# Patient Record
Sex: MALE | Race: WHITE | NOT HISPANIC OR LATINO | Employment: FULL TIME | URBAN - METROPOLITAN AREA
[De-identification: names, ages, dates, MRNs, and addresses within clinical notes are randomized per-mention and may not be internally consistent; named-entity substitution may affect disease eponyms.]

---

## 2018-01-12 NOTE — MISCELLANEOUS
Message   Recorded as Task   Date: 11/03/2016 10:42 AM, Created By: Lisa Reynolds   Task Name: Follow Up   Assigned To: [facility] procedure,Team   Regarding Patient: Tiffanie Lott, Status: Active   CommentAnabella Anaya - 03 Nov 2016 10:42 AM     TASK CREATED  pt is S/P SACROCOCCYGEAL LIGMENT INJ 10/27/2016 by Dr Beau Hughes  no pain diary scanned   no f/u scheduled   Maddy Hale - 04 Nov 2016 9:05 AM     TASK EDITED  spoke to pt wife she stated his got relief from inj no pain, swelling or redness pt syd call office if he feels pain   Shalonda Giles - 04 Nov 2016 9:09 AM     TASK REPLIED TO: Previously Assigned To Shalonda Giles md aware   pt may f/u PRN then        Active Problems    1  Acute gastroenteritis (558 9) (K52 9)   2  Acute maxillary sinusitis (461 0) (J01 00)   3  Acute pharyngitis (462) (J02 9)   4  Acute sinusitis (461 9) (J01 90)   5  Acute URI (465 9) (J06 9)   6  Allergic rhinitis (477 9) (J30 9)   7  Anxiety disorder (300 00) (F41 9)   8  Coccydynia (724 79) (M53 3)   9  Fever (780 60) (R50 9)   10  Headache (784 0) (R51)   11  Insomnia (780 52) (G47 00)   12  Migraine headache (346 90) (G43 909)   13  Need for vaccination against respiratory syncytial virus (V04 82) (Z23)    Current Meds   1  Fluticasone Propionate 50 MCG/ACT Nasal Suspension; USE 2 SPRAYS IN EACH   NOSTRIL ONCE DAILY; Therapy: [id number] to (Last Rx:[id number])  Requested for: [id number] Ordered    Allergies    1  Iodinated Contrast Media    2   IV Dye    Signatures   Electronically signed by : Emigdio Shirley, ; Nov 4 2016  9:09AM EST                       (Author)

## 2018-01-15 NOTE — PROGRESS NOTES
Assessment    1  Acute sinusitis (461 9) (J01 90)   2  Acute pharyngitis (462) (J02 9)    Plan  Acute pharyngitis    · Rapid StrepA- POC; Source:Throat; Status:Complete;   Done: [id number] 11:48AM  Acute pharyngitis, Acute sinusitis    · Promethazine-DM 6 25-15 MG/5ML Oral Syrup; 1-2 tsp po qhs prn  Acute sinusitis    · Amoxicillin 875 MG Oral Tablet; Take 1 tablet twice daily    Discussion/Summary    #1 frontal sinusitis  #2 acute pharyngitis  - I reviewed with patient  Rapid strep was negative  At this point recommend treating sinuses with amoxicillin 875 twice a day  Continue conservative therapy  Recheck one week if not improved-earlier if worse  Patient to call for problems or concerns in the interim  The patient was counseled regarding diagnostic results, instructions for management, risk factor reductions, prognosis, patient and family education, impressions, risks and benefits of treatment options, importance of compliance with treatment  Possible side effects of new medications were reviewed with the patient/guardian today  The treatment plan was reviewed with the patient/guardian  The patient/guardian understands and agrees with the treatment plan      Chief Complaint    1  Cold Symptoms    History of Present Illness  HPI: as above  - 2 day hx of sore throat and sinus pain  Patient describes sore throat as being severe and lasting most of the day  He denies any fever but has chills  Pain is in the frontal sinuses and worse with head position  There is thick nasal discharge  Patient has occasional cough is productive in the morning  He denies shortness of breath      Review of Systems    Constitutional: as noted in HPI    ENT: as noted in HPI  Cardiovascular: no complaints of slow or fast heart rate, no chest pain, no palpitations, no leg claudication or lower extremity edema  Respiratory: as noted in HPI     Integumentary: no complaints of skin rash or lesion, no itching or dry skin, no skin wounds  Active Problems    1  Acute gastroenteritis (558 9) (K52 9)   2  Acute pharyngitis (462) (J02 9)   3  Acute sinusitis (461 9) (J01 90)   4  Anxiety disorder (300 00) (F41 9)   5  Fever (780 60) (R50 9)   6  Headache (784 0) (R51)   7  Insomnia (780 52) (G47 00)   8  Migraine headache (346 90) (G43 909)   9  Need for vaccination against respiratory syncytial virus (V04 82) (Z23)    Past Medical History    1  History of Hay fever (477 9) (J30 1)   2  History of migraine (V12 49) (Z86 69)   3  Need for vaccination against respiratory syncytial virus (V04 82) (Z23)  Active Problems And Past Medical History Reviewed: The active problems and past medical history were reviewed and updated today  Family History    1  Family history of Diabetes Mellitus (V18 0)   2  Family history of Heart Disease (V17 49)   3  Family history of Hypertension (V17 49)    4  Family history of Breast Cancer (V16 3)    5  Family history of Cancer    6  Family history of Cancer    7  Family history of Cancer    Social History    · Denied: History of Alcohol Use (History)   · Being A Social Drinker   · Daily Coffee Consumption (1  Cups/Day)   · Daily Cola Consumption (___ Cans/Day)   · Daily Tea Consumption (___ Cups/Day)   · Marital History - Currently    · Never A Smoker  The social history was reviewed and updated today  Surgical History    1  History of Hand Surgery   2  History of Nasal Septal Deviation Repair   3  History of Nose Surgery   4  History of Treatment Of Elbow Dislocation   5  History of Umbilical Hernia Repair - Over Age 4    Current Meds   1  No Reported Medications Recorded    The medication list was reviewed and updated today  Allergies    1  Iodinated Contrast Media    2   IV Dye    Vitals   Recorded: 11BBQ1188 11:15AM   Temperature 97 2 F   Heart Rate 72   Systolic 132   Diastolic 78   Height 5 ft 9 in   Weight 212 lb    BMI Calculated 31 31   BSA Calculated 2 11     Physical Exam    Constitutional   General appearance: Abnormal   Mildly ill-appearing male in no apparent distress  Eyes   Conjunctiva and lids: No swelling, erythema, or discharge  Pupils and irises: Equal, round and reactive to light  Ears, Nose, Mouth, and Throat   External inspection of ears and nose: Normal     Otoscopic examination: Tympanic membrance translucent with normal light reflex  Canals patent without erythema  Nasal mucosa, septum, and turbinates: Abnormal   Nose congested with thick discharge  Frontal sinuses are tender to palpation  Oropharynx: Abnormal   Posterior oropharynx with moderate erythema but no exit appreciated  Pulmonary   Respiratory effort: No increased work of breathing or signs of respiratory distress  Auscultation of lungs: Clear to auscultation, equal breath sounds bilaterally, no wheezes, no rales, no rhonci  Cardiovascular   Auscultation of heart: Normal rate and rhythm, normal S1 and S2, without murmurs  Lymphatic   Palpation of lymph nodes in neck: No lymphadenopathy  Skin   Skin and subcutaneous tissue: Normal without rashes or lesions           Results/Data  PHQ-2 Adult Depression Screening 29Jan2016 11:16AM User, Ahs     Test Name Result Flag Reference   PHQ-2 Adult Depression Score 0     Q1: 0, Q2: 0   PHQ-2 Adult Depression Screening Negative         Signatures   Electronically signed by : SHAILESH Ngo D ; Jan 29 2016  1:20PM EST                       (Author)

## 2018-05-08 ENCOUNTER — TELEPHONE (OUTPATIENT)
Dept: RADIOLOGY | Facility: CLINIC | Age: 48
End: 2018-05-08

## 2018-05-08 NOTE — TELEPHONE ENCOUNTER
Received voicemail from pt requesting to schedule an injection with Dr Nino Adamson  Called pt back at [phone] and had to [facility] for him to cb again

## 2018-05-16 ENCOUNTER — OFFICE VISIT (OUTPATIENT)
Dept: PAIN MEDICINE | Facility: CLINIC | Age: 48
End: 2018-05-16
Payer: COMMERCIAL

## 2018-05-16 ENCOUNTER — TRANSCRIBE ORDERS (OUTPATIENT)
Dept: LAB | Facility: CLINIC | Age: 48
End: 2018-05-16

## 2018-05-16 ENCOUNTER — APPOINTMENT (OUTPATIENT)
Dept: RADIOLOGY | Facility: CLINIC | Age: 48
End: 2018-05-16
Payer: COMMERCIAL

## 2018-05-16 VITALS
BODY MASS INDEX: 28.88 KG/M2 | HEIGHT: 69 IN | TEMPERATURE: 98.4 F | HEART RATE: 102 BPM | SYSTOLIC BLOOD PRESSURE: 101 MMHG | WEIGHT: 195 LBS | DIASTOLIC BLOOD PRESSURE: 72 MMHG

## 2018-05-16 DIAGNOSIS — M54.6 LEFT-SIDED THORACIC BACK PAIN, UNSPECIFIED CHRONICITY: ICD-10-CM

## 2018-05-16 DIAGNOSIS — M53.3 COCCYDYNIA: Primary | ICD-10-CM

## 2018-05-16 PROCEDURE — 72070 X-RAY EXAM THORAC SPINE 2VWS: CPT

## 2018-05-16 PROCEDURE — 99214 OFFICE O/P EST MOD 30 MIN: CPT | Performed by: NURSE PRACTITIONER

## 2018-05-16 RX ORDER — FLUTICASONE PROPIONATE 50 MCG
2 SPRAY, SUSPENSION (ML) NASAL DAILY
COMMUNITY
Start: 2016-04-14 | End: 2022-01-11 | Stop reason: ALTCHOICE

## 2018-05-16 NOTE — PROGRESS NOTES
Assessment:  1  Coccydynia    2  Left-sided thoracic back pain, unspecified chronicity        Plan:  The patient is a 51 y o  male with a history of coccydynia  The patient presents today with reemergence of coccydynia  Patient reports that his pain and symptoms are the same pain and symptoms he experienced prior to undergoing of sacrococcygeal ligament injection in October of 2017, which provided him 4 months relief  Therefore at this time the patient would like to proceed with a repeat sacrococcygeal ligament injection at this time  The patient was educated on the procedures most common risks  The patient verbalized understanding, and would like to proceed with the procedure  Therefore the patient be scheduled for an upcoming Tuesday or Thursday  The patient also presents today with left-sided midback pain which has been present for a couple of months patient reports that the pain starts in his mid back and radiates into his chest  Patient reported that he had gone to an urgent care center in the past where he was given a shot of Toradol, and was provided some oral NSAIDs which relieved his pain  Therefore, at this time I have referred the patient to physical therapy for evaluation and treatment of his left-sided thoracic back pain, and ordered the patient an x-ray of his thoracic spine for further evaluation  Patient was instructed that our office will call with results of his thoracic x-ray  I discussed with the patient that if his symptoms did not improve after 6 weeks of physical therapy, we could possibly move forward with a thoracic MRI his next office visit  Patient verbalized understanding  Complete risks and benefits including bleeding, infection, tissue reaction, nerve injury and allergic reaction were discussed  The approach was demonstrated using models and literature was provided  Verbal and written consent was obtained      The patient will follow up after his sacrococcygeal ligament injection, or sooner with the worsening of symptoms  History of Present Illness: The patient is a 51 y o  male with a history of coccydynia  The patient was last seen office on 10/27/2016 where he underwent a sacrococcygeal ligament injection, which provided him 4 months of relief  Patient presents for a follow up office visit in regards to chronic pain secondary to coccydynia and left mid back pain  The patient currently reports ongoing coccydynia and left mid back pain  Patient reports that his left-sided mid back pain started a couple months ago  Patient predominantly left-handed  Patient reports that he experiences an itching, tingling pain noted in his left thoracic area that radiates into his chest  Patient denies any recent injury or trauma  Patient denies bilateral leg weakness, or bowel or bladder issues  He describes his pain as dull aching, pressure-like pain that is intermittent nature occurring mostly during the evening hours  Patient reports that his pain is symptoms are unchanged since last visit  Patient currently rates his pain as 7 out 10 numeric pain scale  I have personally reviewed and/or updated the patient's past medical history, past surgical history, family history, social history, current medications, allergies, and vital signs today  Review of Systems:    Review of Systems   Respiratory: Negative for shortness of breath  Cardiovascular: Negative for chest pain  Gastrointestinal: Negative for constipation, diarrhea, nausea and vomiting  Musculoskeletal: Negative for arthralgias, gait problem, joint swelling and myalgias  Skin: Negative for rash  Neurological: Negative for dizziness, seizures and weakness  All other systems reviewed and are negative  History reviewed  No pertinent past medical history      Past Surgical History:   Procedure Laterality Date    ELBOW SURGERY Right     HAND SURGERY Right     NASAL SEPTUM SURGERY         Family History   Problem Relation Age of Onset    Cancer Mother     Cancer Father     Heart disease Father        Social History     Occupational History    Not on file  Social History Main Topics    Smoking status: Never Smoker    Smokeless tobacco: Never Used    Alcohol use No    Drug use: Unknown    Sexual activity: Not on file         Current Outpatient Prescriptions:     fluticasone (FLONASE) 50 mcg/act nasal spray, 2 sprays into each nostril daily, Disp: , Rfl:     Allergies   Allergen Reactions    Iodinated Diagnostic Agents Hives    Iodine Other (See Comments)     iv contrast->hives       Physical Exam:    /72   Pulse 102   Temp 98 4 °F (36 9 °C) (Oral)   Ht 5' 9" (1 753 m)   Wt 88 5 kg (195 lb)   BMI 28 80 kg/m²     Constitutional:normal, well developed, well nourished, alert, in no distress and non-toxic and no overt pain behavior    Eyes:anicteric  HEENT:grossly intact  Neck:supple, symmetric, trachea midline and no masses   Pulmonary:even and unlabored  Cardiovascular:No edema or pitting edema present  Skin:Normal without rashes or lesions and well hydrated  Psychiatric:Mood and affect appropriate  Neurologic:Cranial Nerves II-XII grossly intact  Musculoskeletal:normal    Thoracic Spine Exam    Appearance:  Normal lordosis  Palpation/Tenderness:  left thoracic paraspinal tenderness  Sensory:  no sensory deficits noted  Range of Motion:  Full range of motion with no pain or limitations in flexion, extension, lateral flexion and rotation  Motor Strength:  Left hip flexion:  5/5  Right hip flexion:  5/5  Left knee extension:  5/5  Right knee extension:  5/5  Left foot dorsiflexion:  5/5  Left foot plantar flexion:  5/5  Right foot dorsiflexion:  5/5  Right foot plantar flexion:  5/5        Imaging        Orders Placed This Encounter   Procedures    FL spine and pain procedure    X-ray thoracic spine 2 views    Ambulatory referral to Physical Therapy

## 2018-05-22 ENCOUNTER — TELEPHONE (OUTPATIENT)
Dept: PAIN MEDICINE | Facility: CLINIC | Age: 48
End: 2018-05-22

## 2018-05-29 ENCOUNTER — HOSPITAL ENCOUNTER (OUTPATIENT)
Dept: RADIOLOGY | Facility: CLINIC | Age: 48
Discharge: HOME | End: 2018-05-29
Attending: ANESTHESIOLOGY | Admitting: ANESTHESIOLOGY
Payer: COMMERCIAL

## 2018-05-29 VITALS
RESPIRATION RATE: 18 BRPM | SYSTOLIC BLOOD PRESSURE: 124 MMHG | DIASTOLIC BLOOD PRESSURE: 89 MMHG | TEMPERATURE: 98.2 F | HEART RATE: 76 BPM | OXYGEN SATURATION: 98 %

## 2018-05-29 DIAGNOSIS — M53.3 COCCYDYNIA: ICD-10-CM

## 2018-05-29 PROCEDURE — 20605 DRAIN/INJ JOINT/BURSA W/O US: CPT | Performed by: ANESTHESIOLOGY

## 2018-05-29 PROCEDURE — 77002 NEEDLE LOCALIZATION BY XRAY: CPT | Performed by: ANESTHESIOLOGY

## 2018-05-29 RX ORDER — METHYLPREDNISOLONE ACETATE 80 MG/ML
80 INJECTION, SUSPENSION INTRA-ARTICULAR; INTRALESIONAL; INTRAMUSCULAR; PARENTERAL; SOFT TISSUE ONCE
Status: COMPLETED | OUTPATIENT
Start: 2018-05-29 | End: 2018-05-29

## 2018-05-29 RX ORDER — BUPIVACAINE HCL/PF 2.5 MG/ML
10 VIAL (ML) INJECTION ONCE
Status: COMPLETED | OUTPATIENT
Start: 2018-05-29 | End: 2018-05-29

## 2018-05-29 RX ORDER — LIDOCAINE HYDROCHLORIDE 10 MG/ML
5 INJECTION, SOLUTION EPIDURAL; INFILTRATION; INTRACAUDAL; PERINEURAL ONCE
Status: COMPLETED | OUTPATIENT
Start: 2018-05-29 | End: 2018-05-29

## 2018-05-29 RX ADMIN — LIDOCAINE HYDROCHLORIDE 2 ML: 10 INJECTION, SOLUTION EPIDURAL; INFILTRATION; INTRACAUDAL; PERINEURAL at 09:08

## 2018-05-29 RX ADMIN — Medication 3 ML: at 09:10

## 2018-05-29 RX ADMIN — METHYLPREDNISOLONE ACETATE 80 MG: 80 INJECTION, SUSPENSION INTRA-ARTICULAR; INTRALESIONAL; INTRAMUSCULAR; PARENTERAL; SOFT TISSUE at 09:10

## 2018-05-29 NOTE — DISCHARGE INSTRUCTIONS
Steroid Joint Injection   WHAT YOU NEED TO KNOW:   A steroid joint injection is a procedure to inject steroid medicine into a joint  Steroid medicine decreases pain and inflammation  The injection may also contain an anesthetic (numbing medicine) to decrease pain  It may be done to treat conditions such as arthritis, gout, or carpal tunnel syndrome  The injections may be given in your knee, ankle, shoulder, elbow, wrist, ankle or sacroiliac joint  1  Do not apply heat to any area that is numb  If you have discomfort or soreness at the injection site, you may apply ice today, 20 minutes on and 20 minutes off  Tomorrow you may use ice or warm, moist heat  Do not apply ice or heat directly to the skin  2  You may have an increase or change in the discomfort for 36-48 hours after your treatment  Apply ice and continue with any pain medicine you have been prescribed  3  Do not do anything strenuous today  You may shower, but no tub baths or hot tubs today  You may resume your normal activities tomorrow, but do not overdo it  Resume normal activities slowly when you are feeling better  4  If you experience redness, drainage or swelling at the injection site, or if you develop a fever above 100 degrees, please call [facility] at [phone] or go to the Emergency Room  5  Continue to take all routine medicines prescribed by your primary care physician unless otherwise instructed by our staff  Most blood thinners should be started again according to your regularly scheduled dosing  If you have any questions, please give our office a call  If you have a problem specifically related to your procedure, please call our office at [phone]  Problems not related to your procedure should be directed to your primary care physician

## 2018-05-29 NOTE — H&P
History of Present Illness: The patient is a 51 y o  male who presents with complaints of tailbone pain secondary to coccydynia and is here today for a sacrococcygeal ligament injection  Patient Active Problem List   Diagnosis    Coccydynia    Migraine headache    Insomnia    Allergic rhinitis    Anxiety disorder       No past medical history on file  Past Surgical History:   Procedure Laterality Date    ELBOW SURGERY Right     HAND SURGERY Right     NASAL SEPTUM SURGERY           Current Outpatient Prescriptions:     fluticasone (FLONASE) 50 mcg/act nasal spray, 2 sprays into each nostril daily, Disp: , Rfl:     Allergies   Allergen Reactions    Iodinated Diagnostic Agents Hives    Iodine Other (See Comments)     iv contrast->hives       Physical Exam:   Vitals:    05/29/18 0851   BP: 130/86   Pulse: 83   Resp: 18   Temp: 98 2 °F (36 8 °C)   SpO2: 97%     General: Awake, Alert, Oriented x 3, Mood and affect appropriate  Respiratory: Respirations even and unlabored  Cardiovascular: Peripheral pulses intact; no edema  Musculoskeletal Exam:  Tailbone tenderness    ASA Score: 1    Assessment:   1   Coccydynia        Plan: sacrococcygeal ligament injection

## 2018-06-04 ENCOUNTER — TELEPHONE (OUTPATIENT)
Dept: RADIOLOGY | Facility: CLINIC | Age: 48
End: 2018-06-04

## 2019-02-19 ENCOUNTER — OFFICE VISIT (OUTPATIENT)
Dept: PAIN MEDICINE | Facility: CLINIC | Age: 49
End: 2019-02-19
Payer: COMMERCIAL

## 2019-02-19 VITALS — DIASTOLIC BLOOD PRESSURE: 78 MMHG | HEART RATE: 105 BPM | SYSTOLIC BLOOD PRESSURE: 124 MMHG | TEMPERATURE: 98.3 F

## 2019-02-19 DIAGNOSIS — M53.3 COCCYDYNIA: Primary | ICD-10-CM

## 2019-02-19 PROCEDURE — 99214 OFFICE O/P EST MOD 30 MIN: CPT | Performed by: ANESTHESIOLOGY

## 2019-02-19 NOTE — PROGRESS NOTES
Assessment:  1  Coccydynia        Plan: At this time, I discussed repeating the sacrococcygeal ligament injection that provided significant relief  He was again apprised of the most common risks and would like to proceed  He will be scheduled for an upcoming Tuesday or Thursday under fluoroscopic guidance  Complete risks and benefits including bleeding, infection, tissue reaction, nerve injury and allergic reaction were discussed  The approach was demonstrated using models and literature was provided  Verbal and written consent was obtained  My impressions and treatment recommendations were discussed in detail with the patient who verbalized understanding and had no further questions  Discharge instructions were provided  I personally saw and examined the patient and I agree with the above discussed plan of care  Orders Placed This Encounter   Procedures    FL spine and pain procedure     Standing Status:   Future     Standing Expiration Date:   2/19/2023     Scheduling Instructions:      Please double book for 11 am 2/26/19 (Patient flying to [facility] 3/4)     Order Specific Question:   Reason for Exam:     Answer:   Sacrococcygeal Ligament Injection     Order Specific Question:   Anticoagulant hold needed? Answer:   No     No orders of the defined types were placed in this encounter  History of Present Illness:  Hazeline Klinefelter  is a 51 y o  male who presents for a follow up office visit in regards to Back Pain  The patient has a history of coccydynia and was last seen in May 2018 for a sacrococcygeal ligament injection which provided significant relief up until recently  He has had recurrence of symptoms  He reports intermittent sharp pain in his tailbone region  I have personally reviewed and/or updated the patient's past medical history, past surgical history, family history, social history, current medications, allergies, and vital signs today       Review of Systems Respiratory: Negative for shortness of breath  Cardiovascular: Negative for chest pain  Gastrointestinal: Negative for constipation, diarrhea, nausea and vomiting  Musculoskeletal: Negative for arthralgias, gait problem, joint swelling and myalgias  Skin: Negative for rash  Neurological: Negative for dizziness, seizures and weakness  All other systems reviewed and are negative  Patient Active Problem List   Diagnosis    Coccydynia    Migraine headache    Insomnia    Allergic rhinitis    Anxiety disorder       No past medical history on file  Past Surgical History:   Procedure Laterality Date    ELBOW SURGERY Right     HAND SURGERY Right     NASAL SEPTUM SURGERY         Family History   Problem Relation Age of Onset   Anni Meléndez Cancer Mother     Cancer Father     Heart disease Father        Social History     Occupational History    Not on file   Tobacco Use    Smoking status: Never Smoker    Smokeless tobacco: Never Used   Substance and Sexual Activity    Alcohol use: No    Drug use: Not on file    Sexual activity: Not on file       Current Outpatient Medications on File Prior to Visit   Medication Sig    fluticasone (FLONASE) 50 mcg/act nasal spray 2 sprays into each nostril daily     No current facility-administered medications on file prior to visit  Allergies   Allergen Reactions    Iodinated Diagnostic Agents Hives    Iodine Other (See Comments)     iv contrast->hives       Physical Exam:    /78   Pulse 105   Temp 98 3 °F (36 8 °C) (Oral)     Constitutional:normal, well developed, well nourished, alert, in no distress and non-toxic and no overt pain behavior    Eyes:anicteric  HEENT:grossly intact  Neck:supple, symmetric, trachea midline and no masses   Pulmonary:even and unlabored  Cardiovascular:No edema or pitting edema present  Skin:Normal without rashes or lesions and well hydrated  Psychiatric:Mood and affect appropriate  Neurologic:Cranial Nerves II-XII grossly intact  Musculoskeletal:normal     Lumbar Spine Exam  Appearance:  Normal lordosis  Palpation/Tenderness:  Positive tailbone tenderness  Sensory:  no sensory deficits noted  Range of Motion:  Full range of motion with no pain or limitations in flexion, extension, lateral flexion and rotation  Motor Strength:  Left hip flexion:  5/5  Left hip extension:  5/5  Right hip flexion:  5/5  Right hip extension:  5/5  Left knee flexion:  5/5  Left knee extension:  5/5  Right knee flexion:  5/5  Right knee extension:  5/5  Left foot dorsiflexion:  5/5  Left foot plantar flexion:  5/5  Right foot dorsiflexion:  5/5  Right foot plantar flexion:  5/5  Reflexes:  Left Patellar:  2+   Right Patellar:  2+   Left Achilles:  2+   Right Achilles:  2+

## 2019-02-26 ENCOUNTER — HOSPITAL ENCOUNTER (OUTPATIENT)
Dept: RADIOLOGY | Facility: CLINIC | Age: 49
Discharge: HOME | End: 2019-02-26
Attending: ANESTHESIOLOGY
Payer: COMMERCIAL

## 2019-02-26 VITALS
TEMPERATURE: 98.8 F | HEART RATE: 83 BPM | SYSTOLIC BLOOD PRESSURE: 120 MMHG | OXYGEN SATURATION: 96 % | DIASTOLIC BLOOD PRESSURE: 80 MMHG | RESPIRATION RATE: 20 BRPM

## 2019-02-26 DIAGNOSIS — M53.3 COCCYDYNIA: ICD-10-CM

## 2019-02-26 PROCEDURE — 77002 NEEDLE LOCALIZATION BY XRAY: CPT | Performed by: ANESTHESIOLOGY

## 2019-02-26 PROCEDURE — 77002 NEEDLE LOCALIZATION BY XRAY: CPT

## 2019-02-26 PROCEDURE — 20605 DRAIN/INJ JOINT/BURSA W/O US: CPT | Performed by: ANESTHESIOLOGY

## 2019-02-26 RX ORDER — BUPIVACAINE HCL/PF 2.5 MG/ML
10 VIAL (ML) INJECTION ONCE
Status: COMPLETED | OUTPATIENT
Start: 2019-02-26 | End: 2019-02-26

## 2019-02-26 RX ORDER — METHYLPREDNISOLONE ACETATE 80 MG/ML
80 INJECTION, SUSPENSION INTRA-ARTICULAR; INTRALESIONAL; INTRAMUSCULAR; PARENTERAL; SOFT TISSUE ONCE
Status: COMPLETED | OUTPATIENT
Start: 2019-02-26 | End: 2019-02-26

## 2019-02-26 RX ORDER — 0.9 % SODIUM CHLORIDE 0.9 %
10 VIAL (ML) INJECTION ONCE
Status: COMPLETED | OUTPATIENT
Start: 2019-02-26 | End: 2019-02-26

## 2019-02-26 RX ADMIN — SODIUM CHLORIDE 2 ML: 9 INJECTION, SOLUTION INTRAMUSCULAR; INTRAVENOUS; SUBCUTANEOUS at 11:13

## 2019-02-26 RX ADMIN — BUPIVACAINE HYDROCHLORIDE 3 ML: 2.5 INJECTION, SOLUTION EPIDURAL; INFILTRATION; INTRACAUDAL at 11:15

## 2019-02-26 RX ADMIN — Medication 2 ML: at 11:13

## 2019-02-26 RX ADMIN — METHYLPREDNISOLONE ACETATE 80 MG: 80 INJECTION, SUSPENSION INTRA-ARTICULAR; INTRALESIONAL; INTRAMUSCULAR; PARENTERAL; SOFT TISSUE at 11:15

## 2019-02-26 NOTE — DISCHARGE INSTR - LAB
1  Do not apply heat to any area that is numb  If you have discomfort or soreness at the injection site, you may apply ice today, 20 minutes on and 20 minutes off  Tomorrow you may use ice or warm, moist heat  Do not apply ice or heat directly to the skin  2  If you experience severe shortness of breath, go to the Emergency Room  3  You may have numbness for several hours from the local anesthetic  Please use caution and common sense, especially with weight-bearing activities  4  You may have an increase or change in the discomfort for 36-48 hours after your treatment  Apply ice and continue with any pain medicine you have been prescribed  5  Do not do anything strenuous today  You may shower, but no tub baths or hot tubs today  You may resume your normal activities tomorrow, but do not overdo it  Resume normal activities slowly when you are feeling better  6  If you experience redness, drainage or swelling at the injection site, or if you develop a fever above 100 degrees, please call [facility] at [phone] or go to the Emergency Room  7  Continue to take all routine medicines prescribed by your primary care physician unless otherwise instructed by our staff  Most blood thinners should be started again according to your regularly scheduled dosing  If you have any questions, please give our office a call  If you have a problem specifically related to your procedure, please call our office at [phone]  Problems not related to your procedure should be directed to your primary care physician

## 2019-02-26 NOTE — H&P
History of Present Illness: The patient is a 51 y o  male who presents with complaints of tailbone main secondary to coccydynia and is here today for sacrococcygeal ligament injection  Patient Active Problem List   Diagnosis    Coccydynia    Migraine headache    Insomnia    Allergic rhinitis    Anxiety disorder       No past medical history on file  Past Surgical History:   Procedure Laterality Date    ELBOW SURGERY Right     HAND SURGERY Right     NASAL SEPTUM SURGERY           Current Outpatient Medications:     fluticasone (FLONASE) 50 mcg/act nasal spray, 2 sprays into each nostril daily, Disp: , Rfl:     Current Facility-Administered Medications:     bupivacaine (PF) (MARCAINE) 0 25 % injection 10 mL, 10 mL, Intra-articular, Once, Raymundo Franks MD    lidocaine (PF) (XYLOCAINE-MPF) 2 % injection 5 mL, 5 mL, Infiltration, Once, Raymundo Franks MD    methylPREDNISolone acetate (DEPO-MEDROL) injection 80 mg, 80 mg, Intra-articular, Once, Raymundo Franks MD    sodium chloride (PF) 0 9 % injection 10 mL, 10 mL, Infiltration, Once, Raymundo Franks MD    Allergies   Allergen Reactions    Iodinated Diagnostic Agents Hives    Iodine Other (See Comments)     iv contrast->hives       Physical Exam:   Vitals:    02/26/19 1058   BP: 111/76   Pulse: 87   Resp: 20   Temp: 98 8 °F (37 1 °C)   SpO2: 97%     General: Awake, Alert, Oriented x 3, Mood and affect appropriate  Respiratory: Respirations even and unlabored  Cardiovascular: Peripheral pulses intact; no edema  Musculoskeletal Exam:   Tailbone pain    ASA Score: 1    Patient/Chart Verification  Patient ID Verified: Verbal  Consents Confirmed: To be obtained in the Pre-Procedure area  H&P( within 30 days) Verified: To be obtained in the Pre-Procedure area  Allergies Reviewed: Yes  Anticoag/NSAID held?: NA  Currently on antibiotics?: No    Assessment:   1   Coccydynia        Plan: Sacrococcygeal Ligament Injection

## 2019-03-05 ENCOUNTER — TELEPHONE (OUTPATIENT)
Dept: PAIN MEDICINE | Facility: CLINIC | Age: 49
End: 2019-03-05

## 2021-04-14 NOTE — TELEPHONE ENCOUNTER
SINGH regarding results of thoracic x-ray is wnl  Left c/b #, [place] provided  Advised to call us with any questions        ----- Message from [address] [employer] sent at 5/22/2018 12:04 PM EDT -----  Please call the patient and let him know that his thoracic x-ray is wnl 
negative

## 2021-06-08 ENCOUNTER — TELEPHONE (OUTPATIENT)
Dept: FAMILY MEDICINE CLINIC | Facility: CLINIC | Age: 51
End: 2021-06-08

## 2021-06-08 NOTE — TELEPHONE ENCOUNTER
He wants to re est , hasn't been seen since 2016,  Having sleep issues, he thinks he needs a cpap  When can you see?

## 2021-06-14 NOTE — TELEPHONE ENCOUNTER
Called again and left another messsge to call the office if he still wants appoint with Dr Sheila Ceballos

## 2021-08-24 ENCOUNTER — OFFICE VISIT (OUTPATIENT)
Dept: FAMILY MEDICINE CLINIC | Facility: CLINIC | Age: 51
End: 2021-08-24
Payer: COMMERCIAL

## 2021-08-24 VITALS
SYSTOLIC BLOOD PRESSURE: 114 MMHG | HEART RATE: 112 BPM | HEIGHT: 69 IN | TEMPERATURE: 98.2 F | WEIGHT: 205 LBS | DIASTOLIC BLOOD PRESSURE: 70 MMHG | OXYGEN SATURATION: 96 % | BODY MASS INDEX: 30.36 KG/M2

## 2021-08-24 DIAGNOSIS — Z11.59 NEED FOR HEPATITIS C SCREENING TEST: ICD-10-CM

## 2021-08-24 DIAGNOSIS — G47.30 SLEEP APNEA, UNSPECIFIED TYPE: Primary | ICD-10-CM

## 2021-08-24 DIAGNOSIS — F33.1 MODERATE EPISODE OF RECURRENT MAJOR DEPRESSIVE DISORDER (HCC): ICD-10-CM

## 2021-08-24 DIAGNOSIS — Z00.00 ANNUAL PHYSICAL EXAM: ICD-10-CM

## 2021-08-24 DIAGNOSIS — Z12.12 SCREENING FOR COLORECTAL CANCER: ICD-10-CM

## 2021-08-24 DIAGNOSIS — Z12.11 SCREENING FOR COLORECTAL CANCER: ICD-10-CM

## 2021-08-24 PROCEDURE — 99386 PREV VISIT NEW AGE 40-64: CPT | Performed by: PHYSICIAN ASSISTANT

## 2021-08-24 PROCEDURE — 1036F TOBACCO NON-USER: CPT | Performed by: PHYSICIAN ASSISTANT

## 2021-08-24 PROCEDURE — 3008F BODY MASS INDEX DOCD: CPT | Performed by: PHYSICIAN ASSISTANT

## 2021-08-24 PROCEDURE — 3725F SCREEN DEPRESSION PERFORMED: CPT | Performed by: PHYSICIAN ASSISTANT

## 2021-08-24 RX ORDER — LEVOCETIRIZINE DIHYDROCHLORIDE 5 MG/1
5 TABLET, FILM COATED ORAL EVERY EVENING
COMMUNITY
End: 2022-01-11 | Stop reason: ALTCHOICE

## 2021-08-24 NOTE — PATIENT INSTRUCTIONS
Insomnia:  1) Get up at the same time seven days out of the week  2) Only go to bed when feeling sleepy  3) Wind down in the evening without electronics  4) Stimulus Control: If lying in bed for 15-20 minutes (estimated because the clock is turned away so you cannot see it) and you are not asleep get up and do something relaxing in a different room (reading a magazine article, solitaire with a deck of cards)  Do this in the middle of the night as well if awake  Avoid doing work or getting on the computer  5) Bedroom for sleep only  No watching TV or using electronics (computer, phone, tablet etc )  in bed  6) Turn clock away so you cannot see it in bed  7) Exercise regularly but try to avoid exercise within 4 hours of bedtime  Morning exercise is best      8) Avoid caffeine in the afternoon  Considering tapering down on caffeine by decreasing by one beverage with caffeine every 3 days until off  9) Avoid smoking near bedtime     10) Avoid alcohol before bed  If you consume one alcoholic beverage allow 3 hours between that drink and bedtime  If you consume two alcoholic beverages allow 5 hours  Between those drinks and bedtime  Alcohol may lead to waking at night  11) Avoid napping except for driving safety  If you feel to sleepy to drive do not drive  If you get sleepy while driving pull over and nap  You may resume driving once you feel alert  12) Read "No More Sleepless Nights" by Sugey Ricardo PhD      13) There are some on-line resources that do require a fee that can be of help  Two credible websites are as follows:  http://Xspand/cbt-online-insomnia-treatment html  [employer] si  An apolinar used by the [facility] [address] [facility] is as follows:  CBT-I   Go! To Sleep by the [facility]  Wellness Visit for Adults   AMBULATORY CARE:   A wellness visit  is when you see your healthcare provider to get screened for health problems   Your healthcare provider will also give you advice on how to stay healthy  Write down your questions so you remember to ask them  Ask your healthcare provider how often you should have a wellness visit  What happens at a wellness visit:  Your healthcare provider will ask about your health, and your family history of health problems  This includes high blood pressure, heart disease, and cancer  He or she will ask if you have symptoms that concern you, if you smoke, and about your mood  You may also be asked about your intake of medicines, supplements, food, and alcohol  Any of the following may be done:  · Your weight  will be checked  Your height may also be checked so your body mass index (BMI) can be calculated  Your BMI shows if you are at a healthy weight  · Your blood pressure  and heart rate will be checked  Your temperature may also be checked  · Blood and urine tests  may be done  Blood tests may be done to check your cholesterol levels  Abnormal cholesterol levels increase your risk for heart disease and stroke  You may also need a blood or urine test to check for diabetes if you are at increased risk  Urine tests may be done to look for signs of an infection or kidney disease  · A physical exam  includes checking your heartbeat and lungs with a stethoscope  Your healthcare provider may also check your skin to look for sun damage  · Screening tests  may be recommended  A screening test is done to check for diseases that may not cause symptoms  The screening tests you may need depend on your age, gender, family history, and lifestyle habits  For example, colorectal screening may be recommended if you are 48years old or older  Screening tests you need if you are a woman:   · A Pap smear  is used to screen for cervical cancer  Pap smears are usually done every 3 to 5 years depending on your age   You may need them more often if you have had abnormal Pap smear test results in the past  Ask your healthcare provider how often you should have a Pap smear  · A mammogram  is an x-ray of your breasts to screen for breast cancer  Experts recommend mammograms every 2 years starting at age 48 years  You may need a mammogram at age 52 years or younger if you have an increased risk for breast cancer  Talk to your healthcare provider about when you should start having mammograms and how often you need them  Vaccines you may need:   · Get an influenza vaccine  every year  The influenza vaccine protects you from the flu  Several types of viruses cause the flu  The viruses change over time, so new vaccines are made each year  · Get a tetanus-diphtheria (Td) booster vaccine  every 10 years  This vaccine protects you against tetanus and diphtheria  Tetanus is a severe infection that may cause painful muscle spasms and lockjaw  Diphtheria is a severe bacterial infection that causes a thick covering in the back of your mouth and throat  · Get a human papillomavirus (HPV) vaccine  if you are female and aged 23 to 32 or male 23 to 24 and never received it  This vaccine protects you from HPV infection  HPV is the most common infection spread by sexual contact  HPV may also cause vaginal, penile, and anal cancers  · Get a pneumococcal vaccine  if you are aged 72 years or older  The pneumococcal vaccine is an injection given to protect you from pneumococcal disease  Pneumococcal disease is an infection caused by pneumococcal bacteria  The infection may cause pneumonia, meningitis, or an ear infection  · Get a shingles vaccine  if you are 60 or older, even if you have had shingles before  The shingles vaccine is an injection to protect you from the varicella-zoster virus  This is the same virus that causes chickenpox  Shingles is a painful rash that develops in people who had chickenpox or have been exposed to the virus  How to eat healthy:  My Plate is a model for planning healthy meals   It shows the types and amounts of foods that should go on your plate  Fruits and vegetables make up about half of your plate, and grains and protein make up the other half  A serving of dairy is included on the side of your plate  The amount of calories and serving sizes you need depends on your age, gender, weight, and height  Examples of healthy foods are listed below:  · Eat a variety of vegetables  such as dark green, red, and orange vegetables  You can also include canned vegetables low in sodium (salt) and frozen vegetables without added butter or sauces  · Eat a variety of fresh fruits , canned fruit in 100% juice, frozen fruit, and dried fruit  · Include whole grains  At least half of the grains you eat should be whole grains  Examples include whole-wheat bread, wheat pasta, brown rice, and whole-grain cereals such as oatmeal     · Eat a variety of protein foods such as seafood (fish and shellfish), lean meat, and poultry without skin (turkey and chicken)  Examples of lean meats include pork leg, shoulder, or tenderloin, and beef round, sirloin, tenderloin, and extra lean ground beef  Other protein foods include eggs and egg substitutes, beans, peas, soy products, nuts, and seeds  · Choose low-fat dairy products such as skim or 1% milk or low-fat yogurt, cheese, and cottage cheese  · Limit unhealthy fats  such as butter, hard margarine, and shortening  Exercise:  Exercise at least 30 minutes per day on most days of the week  Some examples of exercise include walking, biking, dancing, and swimming  You can also fit in more physical activity by taking the stairs instead of the elevator or parking farther away from stores  Include muscle strengthening activities 2 days each week  Regular exercise provides many health benefits  It helps you manage your weight, and decreases your risk for type 2 diabetes, heart disease, stroke, and high blood pressure  Exercise can also help improve your mood   Ask your healthcare provider about the best exercise plan for you  General health and safety guidelines:   · Do not smoke  Nicotine and other chemicals in cigarettes and cigars can cause lung damage  Ask your healthcare provider for information if you currently smoke and need help to quit  E-cigarettes or smokeless tobacco still contain nicotine  Talk to your healthcare provider before you use these products  · Limit alcohol  A drink of alcohol is 12 ounces of beer, 5 ounces of wine, or 1½ ounces of liquor  · Lose weight, if needed  Being overweight increases your risk of certain health conditions  These include heart disease, high blood pressure, type 2 diabetes, and certain types of cancer  · Protect your skin  Do not sunbathe or use tanning beds  Use sunscreen with a SPF 15 or higher  Apply sunscreen at least 15 minutes before you go outside  Reapply sunscreen every 2 hours  Wear protective clothing, hats, and sunglasses when you are outside  · Drive safely  Always wear your seatbelt  Make sure everyone in your car wears a seatbelt  A seatbelt can save your life if you are in an accident  Do not use your cell phone when you are driving  This could distract you and cause an accident  Pull over if you need to make a call or send a text message  · Practice safe sex  Use latex condoms if are sexually active and have more than one partner  Your healthcare provider may recommend screening tests for sexually transmitted infections (STIs)  · Wear helmets, lifejackets, and protective gear  Always wear a helmet when you ride a bike or motorcycle, go skiing, or play sports that could cause a head injury  Wear protective equipment when you play sports  Wear a lifejacket when you are on a boat or doing water sports  © Copyright [address] 2021 Information is for End User's use only and may not be sold, redistributed or otherwise used for commercial purposes   All illustrations and images included in CareNotes® are the copyrighted property of A  [employer] M [employer]  or [facility]  The above information is an  only  It is not intended as medical advice for individual conditions or treatments  Talk to your doctor, nurse or pharmacist before following any medical regimen to see if it is safe and effective for you

## 2021-08-24 NOTE — ASSESSMENT & PLAN NOTE
PHQ-9 Depression Screening    PHQ-9:   Frequency of the following problems over the past two weeks:      Little interest or pleasure in doing things: 2 - more than half the days  Feeling down, depressed, or hopeless: 3 - nearly every day  Trouble falling or staying asleep, or sleeping too much: 1 - several days  Feeling tired or having little energy: 3 - nearly every day  Poor appetite or overeatin - not at all  Feeling bad about yourself - or that you are a failure or have let yourself or your family down: 1 - several days  Trouble concentrating on things, such as reading the newspaper or watching television: 2 - more than half the days  Moving or speaking so slowly that other people could have noticed  Or the opposite - being so fidgety or restless that you have been moving around a lot more than usual: 0 - not at all  Thoughts that you would be better off dead, or of hurting yourself in some way: 0 - not at all  PHQ-2 Score: 5  PHQ-9 Score: 12     Worsening above baseline due to stressor of potentially failing marriage   Started therapy yesterday  Directed to FU if wanting to purse medication options

## 2021-08-24 NOTE — PROGRESS NOTES
[facility]    NAME: Kala Weems  AGE: 45 y o  SEX: male  : 1970     DATE: 2021     Assessment and Plan:     Problem List Items Addressed This Visit        Respiratory    Sleep apnea - Primary     Symptomatic  - Ordered sleep study         Relevant Orders    Diagnostic Sleep Study       Other    Moderate episode of recurrent major depressive disorder (HCC)     PHQ-9 Depression Screening    PHQ-9:   Frequency of the following problems over the past two weeks:      Little interest or pleasure in doing things: 2 - more than half the days  Feeling down, depressed, or hopeless: 3 - nearly every day  Trouble falling or staying asleep, or sleeping too much: 1 - several days  Feeling tired or having little energy: 3 - nearly every day  Poor appetite or overeatin - not at all  Feeling bad about yourself - or that you are a failure or have let yourself or your family down: 1 - several days  Trouble concentrating on things, such as reading the newspaper or watching television: 2 - more than half the days  Moving or speaking so slowly that other people could have noticed  Or the opposite - being so fidgety or restless that you have been moving around a lot more than usual: 0 - not at all  Thoughts that you would be better off dead, or of hurting yourself in some way: 0 - not at all  PHQ-2 Score: 5  PHQ-9 Score: 12     Worsening above baseline due to stressor of potentially failing marriage   Started therapy yesterday  Directed to FU if wanting to purse medication options             Other Visit Diagnoses     Need for hepatitis C screening test        Relevant Orders    Hepatitis C Antibody ([facility])    Annual physical exam        Relevant Orders    TSH, 3rd generation with Free T4 reflex    CBC and Platelet    Comprehensive metabolic panel    Hemoglobin A1C    Lipid Panel with Direct LDL reflex    Screening for colorectal cancer        Relevant Orders    Ambulatory referral to Gastroenterology    BMI 30 0-30 9,adult          Pt is a 45 y o  male  Vaccines: UTD with COVID  encouraged Tdap  Sexual Health: denies any prostatic symptoms  - Labs: per orders    Discussed FU regarding anxiety/depression, possible occasional palpitations  Directed to go to ED with any persistent symptoms or CP    Immunizations and preventive care screenings were discussed with patient today  Appropriate education was printed on patient's after visit summary  Counseling:  Alcohol/drug use: discussed moderation in alcohol intake, the recommendations for healthy alcohol use, and avoidance of illicit drug use  Dental Health: discussed importance of regular tooth brushing, flossing, and dental visits  Sexual health: discussed sexually transmitted diseases, partner selection, use of condoms, avoidance of unintended pregnancy, and contraceptive alternatives  · Exercise: the importance of regular exercise/physical activity was discussed  Recommend exercise 3-5 times per week for at least 30 minutes  BMI Counseling: Body mass index is 30 27 kg/m²  The BMI is above normal  Nutrition recommendations include decreasing portion sizes, encouraging healthy choices of fruits and vegetables, limiting drinks that contain sugar and reducing intake of cholesterol  Exercise recommendations include moderate physical activity 150 minutes/week  No pharmacotherapy was ordered  Return in 3 months (on 11/24/2021)  Chief Complaint:     Chief Complaint   Patient presents with    [facility]      History of Present Illness:     Adult Annual Physical   Patient here for a comprehensive physical exam  The patient reports problems - sleep concerns  Sleeping problems, may have sleep apnea  Worsening the past 2 years  Wakes exhausted, and impacting ability to work  Wakes gasping for breath  Typically sleeps through the night   Able to fall asleep and stays asleep  Father has MAKAYLA  Sleep study several years ago which was inconclusive  Emotional stressor: Wife  from him last weekend  Work - Sedentary,  of professional services  Does not enjoy work, stressful, has not satisfying    Colonoscopy, long time ago  Feeling of depression for several years  Started therapy yesterday  Occasional palpitations at rest, improves with bearing down  1-2 times a month for 6 months  Diet and Physical Activity  · Diet/Nutrition: poor diet  · Exercise: no formal exercise  Depression Screening  PHQ-9 Depression Screening    PHQ-9:   Frequency of the following problems over the past two weeks:      Little interest or pleasure in doing things: 2 - more than half the days  Feeling down, depressed, or hopeless: 3 - nearly every day  Trouble falling or staying asleep, or sleeping too much: 1 - several days  Feeling tired or having little energy: 3 - nearly every day  Poor appetite or overeatin - not at all  Feeling bad about yourself - or that you are a failure or have let yourself or your family down: 1 - several days  Trouble concentrating on things, such as reading the newspaper or watching television: 2 - more than half the days  Moving or speaking so slowly that other people could have noticed  Or the opposite - being so fidgety or restless that you have been moving around a lot more than usual: 0 - not at all  Thoughts that you would be better off dead, or of hurting yourself in some way: 0 - not at all  PHQ-2 Score: 5  PHQ-9 Score: 12       General Health  · Sleep: see above  · Hearing: normal - bilateral   · Vision: no vision problems and wears glasses  · Dental: no dental visits for >1 year and brushes teeth twice daily  Delayed by [facility]  · Symptoms include: none     Review of Systems:     Review of Systems   Constitutional: Negative for activity change, chills, fatigue, fever and unexpected weight change     Respiratory: Positive for chest tightness  Negative for cough, shortness of breath and wheezing  Cardiovascular: Positive for palpitations (1-2 times a month, resolves with vasovagal )  Negative for chest pain and leg swelling  Gastrointestinal: Negative for constipation, diarrhea, nausea and vomiting  Musculoskeletal: Negative for back pain, neck pain and neck stiffness  Allergic/Immunologic: Negative for environmental allergies and food allergies  Neurological: Positive for headaches (1-2 times a week)  Negative for dizziness and weakness  Psychiatric/Behavioral: Positive for dysphoric mood and sleep disturbance  The patient is not nervous/anxious  Past Medical History:     No past medical history on file  Past Surgical History:     Past Surgical History:   Procedure Laterality Date    ELBOW SURGERY Right     HAND SURGERY Right     NASAL SEPTUM SURGERY        Family History:     Family History   Problem Relation Age of Onset    Aneurysm Mother     Heart disease Father     Heart attack Father     Diabetes Father     No Known Problems Son       Social History:     Social History     Socioeconomic History    Marital status: /Civil Union     Spouse name: Not on file    Number of children: Not on file    Years of education: Not on file    Highest education level: Not on file   Occupational History    Not on file   Tobacco Use    Smoking status: Never Smoker    Smokeless tobacco: Never Used   Substance and Sexual Activity    Alcohol use: No    Drug use: Not on file    Sexual activity: Not Currently   Other Topics Concern    Not on file   Social History Narrative    Not on file     Social Determinants of Health     Financial Resource Strain:     Difficulty of Paying Living Expenses:    Food Insecurity:     Worried About Running Out of Food in the Last Year:     Ran Out of Food in the Last Year:    Transportation Needs:     Lack of Transportation (Medical):      Lack of Transportation (Non-Medical):    Physical Activity:     Days of Exercise per Week:     Minutes of Exercise per Session:    Stress:     Feeling of Stress :    Social Connections:     Frequency of Communication with Friends and Family:     Frequency of Social Gatherings with Friends and Family:     Attends Yazidi Services:     Active Member of Clubs or Organizations:     Attends Club or Organization Meetings:     Marital Status:    Intimate Partner Violence:     Fear of Current or Ex-Partner:     Emotionally Abused:     Physically Abused:     Sexually Abused:       Current Medications:     Current Outpatient Medications   Medication Sig Dispense Refill    fluticasone (FLONASE) 50 mcg/act nasal spray 2 sprays into each nostril daily      levocetirizine (XYZAL) 5 MG tablet Take 5 mg by mouth every evening       No current facility-administered medications for this visit  Allergies: Allergies   Allergen Reactions    Iodinated Diagnostic Agents Hives      Physical Exam:     /70 (BP Location: Left arm, Patient Position: Sitting, Cuff Size: Standard)   Pulse (!) 112   Temp 98 2 °F (36 8 °C)   Ht 5' 9" (1 753 m)   Wt 93 kg (205 lb)   SpO2 96%   BMI 30 27 kg/m²     Physical Exam  Constitutional:       Appearance: He is well-developed  HENT:      Head: Normocephalic and atraumatic  Mouth/Throat:      Pharynx: No oropharyngeal exudate  Eyes:      Pupils: Pupils are equal, round, and reactive to light  Neck:      Thyroid: No thyromegaly  Cardiovascular:      Rate and Rhythm: Normal rate and regular rhythm  Heart sounds: Normal heart sounds  No murmur heard  Pulmonary:      Effort: Pulmonary effort is normal  No respiratory distress  Breath sounds: Normal breath sounds  No wheezing  Abdominal:      General: Bowel sounds are normal  There is no distension  Palpations: Abdomen is soft  Tenderness: There is no abdominal tenderness        Hernia: A hernia is present  Hernia is present in the umbilical area (large, reducable)  Musculoskeletal:         General: Normal range of motion  Cervical back: Normal range of motion and neck supple  Lymphadenopathy:      Cervical: No cervical adenopathy  Skin:     General: Skin is warm  Neurological:      Mental Status: He is alert and oriented to person, place, and time  Psychiatric:         Mood and Affect: Mood is depressed  Affect is tearful  Behavior: Behavior normal          Thought Content:  Thought content normal           Immanuel Gomez PA-C  [id number]

## 2021-08-26 ENCOUNTER — TELEPHONE (OUTPATIENT)
Dept: SLEEP CENTER | Facility: CLINIC | Age: 51
End: 2021-08-26

## 2021-08-26 NOTE — TELEPHONE ENCOUNTER
----- Message from Pardeep Lovelace MD sent at 8/26/2021  2:50 PM EDT -----  approved  ----- Message -----  From: Becca Lieberman  Sent: 1/50/0005  10:17 AM EDT  To: Sleep Medicine Basim Provider    This sleep study needs approval      If approved please sign and return to clerical pool  If denied please include reasons why  Also provide alternative testing if warranted  Please sign and return to clerical pool

## 2021-08-27 ENCOUNTER — APPOINTMENT (OUTPATIENT)
Dept: LAB | Facility: CLINIC | Age: 51
End: 2021-08-27
Payer: COMMERCIAL

## 2021-08-27 DIAGNOSIS — Z11.59 NEED FOR HEPATITIS C SCREENING TEST: ICD-10-CM

## 2021-08-27 DIAGNOSIS — Z00.00 ANNUAL PHYSICAL EXAM: ICD-10-CM

## 2021-08-27 LAB
ALBUMIN SERPL BCP-MCNC: 3.7 G/DL (ref 3.5–5)
ALP SERPL-CCNC: 59 U/L (ref 46–116)
ALT SERPL W P-5'-P-CCNC: 22 U/L (ref 12–78)
ANION GAP SERPL CALCULATED.3IONS-SCNC: 5 MMOL/L (ref 4–13)
AST SERPL W P-5'-P-CCNC: 12 U/L (ref 5–45)
BILIRUB SERPL-MCNC: 1.05 MG/DL (ref 0.2–1)
BUN SERPL-MCNC: 15 MG/DL (ref 5–25)
CALCIUM SERPL-MCNC: 9.3 MG/DL (ref 8.3–10.1)
CHLORIDE SERPL-SCNC: 105 MMOL/L (ref 100–108)
CHOLEST SERPL-MCNC: 205 MG/DL (ref 50–200)
CO2 SERPL-SCNC: 28 MMOL/L (ref 21–32)
CREAT SERPL-MCNC: 1.11 MG/DL (ref 0.6–1.3)
ERYTHROCYTE [DISTWIDTH] IN BLOOD BY AUTOMATED COUNT: 12.5 % (ref 11.6–15.1)
EST. AVERAGE GLUCOSE BLD GHB EST-MCNC: 108 MG/DL
GFR SERPL CREATININE-BSD FRML MDRD: 76 ML/MIN/1.73SQ M
GLUCOSE P FAST SERPL-MCNC: 93 MG/DL (ref 65–99)
HBA1C MFR BLD: 5.4 %
HCT VFR BLD AUTO: 48.9 % (ref 36.5–49.3)
HCV AB SER QL: NORMAL
HDLC SERPL-MCNC: 41 MG/DL
HGB BLD-MCNC: 16.5 G/DL (ref 12–17)
LDLC SERPL CALC-MCNC: 126 MG/DL (ref 0–100)
MCH RBC QN AUTO: 29.3 PG (ref 26.8–34.3)
MCHC RBC AUTO-ENTMCNC: 33.7 G/DL (ref 31.4–37.4)
MCV RBC AUTO: 87 FL (ref 82–98)
PLATELET # BLD AUTO: 234 THOUSANDS/UL (ref 149–390)
PMV BLD AUTO: 10.9 FL (ref 8.9–12.7)
POTASSIUM SERPL-SCNC: 4 MMOL/L (ref 3.5–5.3)
PROT SERPL-MCNC: 7.3 G/DL (ref 6.4–8.2)
RBC # BLD AUTO: 5.63 MILLION/UL (ref 3.88–5.62)
SODIUM SERPL-SCNC: 138 MMOL/L (ref 136–145)
TRIGL SERPL-MCNC: 191 MG/DL
TSH SERPL DL<=0.05 MIU/L-ACNC: 0.85 UIU/ML (ref 0.36–3.74)
WBC # BLD AUTO: 6.92 THOUSAND/UL (ref 4.31–10.16)

## 2021-08-27 PROCEDURE — 85027 COMPLETE CBC AUTOMATED: CPT

## 2021-08-27 PROCEDURE — 83036 HEMOGLOBIN GLYCOSYLATED A1C: CPT

## 2021-08-27 PROCEDURE — 80053 COMPREHEN METABOLIC PANEL: CPT

## 2021-08-27 PROCEDURE — 80061 LIPID PANEL: CPT

## 2021-08-27 PROCEDURE — 86803 HEPATITIS C AB TEST: CPT

## 2021-08-27 PROCEDURE — 84443 ASSAY THYROID STIM HORMONE: CPT

## 2021-08-27 PROCEDURE — 36415 COLL VENOUS BLD VENIPUNCTURE: CPT

## 2021-08-31 ENCOUNTER — TELEPHONE (OUTPATIENT)
Dept: GASTROENTEROLOGY | Facility: CLINIC | Age: 51
End: 2021-08-31

## 2021-08-31 NOTE — TELEPHONE ENCOUNTER
Received order from Timothy Nguyễn to call and schedule new pt colon screening  Called and left message for patient to call and schedule  Will call again in 1 wk if he doesn't return call to schedule

## 2021-09-01 ENCOUNTER — PREP FOR PROCEDURE (OUTPATIENT)
Dept: GASTROENTEROLOGY | Facility: CLINIC | Age: 51
End: 2021-09-01

## 2021-09-01 ENCOUNTER — HOSPITAL ENCOUNTER (OUTPATIENT)
Dept: SLEEP CENTER | Facility: CLINIC | Age: 51
Discharge: HOME | End: 2021-09-01
Payer: COMMERCIAL

## 2021-09-01 ENCOUNTER — TELEPHONE (OUTPATIENT)
Dept: GASTROENTEROLOGY | Facility: CLINIC | Age: 51
End: 2021-09-01

## 2021-09-01 DIAGNOSIS — Z12.11 SCREENING FOR COLON CANCER: Primary | ICD-10-CM

## 2021-09-01 DIAGNOSIS — G47.30 SLEEP APNEA, UNSPECIFIED TYPE: ICD-10-CM

## 2021-09-01 PROCEDURE — 95810 POLYSOM 6/> YRS 4/> PARAM: CPT | Performed by: PSYCHIATRY & NEUROLOGY

## 2021-09-01 PROCEDURE — 95810 POLYSOM 6/> YRS 4/> PARAM: CPT

## 2021-09-01 NOTE — TELEPHONE ENCOUNTER
09/01/21  Screened by: Prabhu Butts    Referring Provider Kirstin Anderson    Pre- Screening: There is no height or weight on file to calculate BMI  Has patient been referred for a routine screening Colonoscopy? yes  Is the patient between 39-70 years old? yes      Previous Colonoscopy yes   If yes:    Date: 10+ years     Facility: na     Reason: na      SCHEDULING STAFF: If the patient is between 45yrs-49yrs, please advise patient to confirm benefits/coverage with their insurance company for a routine screening colonoscopy, some insurance carriers will only cover at [address] or older  If the patient is over 66years old, please schedule an office visit  Does the patient want to see a Gastroenterologist prior to their procedure OR are they having any GI symptoms? no    Has the patient been hospitalized or had abdominal surgery in the past 6 months? no    Does the patient use supplemental oxygen? no    Does the patient take Coumadin, Lovenox, Plavix, Elliquis, Xarelto, or other blood thinning medication? no    Has the patient had a stroke, cardiac event, or stent placed in the past year? no    SCHEDULING STAFF: If patient answers NO to above questions, then schedule procedure  If patient answers YES to above questions, then schedule office appointment  If patient is between 45yrs - 49yrs, please advise patient that we will have to confirm benefits & coverage with their insurance company for a routine screening colonoscopy

## 2021-09-02 PROBLEM — G47.33 OSA (OBSTRUCTIVE SLEEP APNEA): Status: ACTIVE | Noted: 2021-08-24

## 2021-09-02 NOTE — PROGRESS NOTES
Sleep Study Documentation    Pre-Sleep Study       Sleep testing procedure explained to patient:YES    Patient napped prior to study:NO    Caffeine:Dayshift worker after 12PM   Caffeine use:YES- coffee  6 to 18 ounces and chocolate milk  8 ounces    Alcohol:Dayshift workers after 5PM: Alcohol use:NO    Typical day for patient:YES       Study Documentation    Sleep Study Indications: Snoring, Excessive Daytime Sleepiness, Parasomnia    Sleep Study: Diagnostic   Snore: Moderate  Supplemental O2: no    O2 flow rate (L/min) range   O2 flow rate (L/min) final   Minimum SaO2 81%  Baseline SaO2 97%    EKG abnormalities: no     EEG abnormalities: no    Sleep Study Recorded < 2 hours: N/A    Sleep Study Recorded > 2 hours but incomplete study: N/A    Sleep Study Recorded 6 hours but no sleep obtained: NO    Patient classification: employed       Post-Sleep Study    Medication used at bedtime or during sleep study:NO    Patient reports time it took to fall asleep:greater than 60 minutes    Patient reports waking up during study:3 or more times  Patient reports returning to sleep in greater than 30 minutes  Patient reports sleeping 2 to 4 hours without dreaming  Patient reports sleep during study:typical    Patient rated sleepiness: Very sleepy or tired    PAP treatment:no

## 2021-09-03 DIAGNOSIS — G47.30 SLEEP APNEA, UNSPECIFIED TYPE: ICD-10-CM

## 2021-09-03 DIAGNOSIS — G47.33 OSA (OBSTRUCTIVE SLEEP APNEA): Primary | ICD-10-CM

## 2021-10-08 ENCOUNTER — ANESTHESIA (OUTPATIENT)
Dept: GASTROENTEROLOGY | Facility: AMBULATORY SURGERY CENTER | Age: 51
End: 2021-10-08

## 2021-10-08 ENCOUNTER — HOSPITAL ENCOUNTER (OUTPATIENT)
Dept: GASTROENTEROLOGY | Facility: AMBULATORY SURGERY CENTER | Age: 51
Discharge: HOME | End: 2021-10-08
Payer: COMMERCIAL

## 2021-10-08 ENCOUNTER — ANESTHESIA EVENT (OUTPATIENT)
Dept: GASTROENTEROLOGY | Facility: AMBULATORY SURGERY CENTER | Age: 51
End: 2021-10-08

## 2021-10-08 VITALS
SYSTOLIC BLOOD PRESSURE: 103 MMHG | TEMPERATURE: 97.7 F | BODY MASS INDEX: 28.88 KG/M2 | RESPIRATION RATE: 18 BRPM | DIASTOLIC BLOOD PRESSURE: 77 MMHG | HEART RATE: 89 BPM | WEIGHT: 195 LBS | OXYGEN SATURATION: 97 % | HEIGHT: 69 IN

## 2021-10-08 DIAGNOSIS — Z12.11 SCREENING FOR COLON CANCER: ICD-10-CM

## 2021-10-08 PROCEDURE — G0121 COLON CA SCRN NOT HI RSK IND: HCPCS | Performed by: INTERNAL MEDICINE

## 2021-10-08 PROCEDURE — 00811 ANES LWR INTST NDSC NOS: CPT | Performed by: NURSE ANESTHETIST, CERTIFIED REGISTERED

## 2021-10-08 RX ORDER — SODIUM CHLORIDE 9 MG/ML
20 INJECTION, SOLUTION INTRAVENOUS CONTINUOUS
Status: DISCONTINUED | OUTPATIENT
Start: 2021-10-08 | End: 2021-10-12 | Stop reason: HOSPADM

## 2021-10-08 RX ORDER — PROPOFOL 10 MG/ML
INJECTION, EMULSION INTRAVENOUS AS NEEDED
Status: DISCONTINUED | OUTPATIENT
Start: 2021-10-08 | End: 2021-10-08

## 2021-10-08 RX ORDER — SODIUM CHLORIDE 9 MG/ML
30 INJECTION, SOLUTION INTRAVENOUS CONTINUOUS
Status: DISCONTINUED | OUTPATIENT
Start: 2021-10-08 | End: 2021-10-12 | Stop reason: HOSPADM

## 2021-10-08 RX ADMIN — PROPOFOL 100 MG: 10 INJECTION, EMULSION INTRAVENOUS at 09:53

## 2021-10-08 RX ADMIN — PROPOFOL 50 MG: 10 INJECTION, EMULSION INTRAVENOUS at 09:57

## 2021-10-08 RX ADMIN — SODIUM CHLORIDE: 9 INJECTION, SOLUTION INTRAVENOUS at 09:42

## 2021-10-08 RX ADMIN — PROPOFOL 100 MG: 10 INJECTION, EMULSION INTRAVENOUS at 09:49

## 2021-10-08 RX ADMIN — PROPOFOL 150 MG: 10 INJECTION, EMULSION INTRAVENOUS at 09:45

## 2021-11-19 ENCOUNTER — RA CDI HCC (OUTPATIENT)
Dept: OTHER | Facility: HOSPITAL | Age: 51
End: 2021-11-19

## 2021-11-29 ENCOUNTER — OFFICE VISIT (OUTPATIENT)
Dept: FAMILY MEDICINE CLINIC | Facility: CLINIC | Age: 51
End: 2021-11-29
Payer: COMMERCIAL

## 2021-11-29 VITALS
WEIGHT: 205 LBS | DIASTOLIC BLOOD PRESSURE: 80 MMHG | HEART RATE: 87 BPM | HEIGHT: 69 IN | BODY MASS INDEX: 30.36 KG/M2 | SYSTOLIC BLOOD PRESSURE: 120 MMHG | OXYGEN SATURATION: 96 % | TEMPERATURE: 98.6 F

## 2021-11-29 DIAGNOSIS — G47.33 OSA (OBSTRUCTIVE SLEEP APNEA): ICD-10-CM

## 2021-11-29 DIAGNOSIS — F41.1 GENERALIZED ANXIETY DISORDER: ICD-10-CM

## 2021-11-29 DIAGNOSIS — F33.1 MODERATE EPISODE OF RECURRENT MAJOR DEPRESSIVE DISORDER (HCC): Primary | ICD-10-CM

## 2021-11-29 PROCEDURE — 3008F BODY MASS INDEX DOCD: CPT | Performed by: PHYSICIAN ASSISTANT

## 2021-11-29 PROCEDURE — 99214 OFFICE O/P EST MOD 30 MIN: CPT | Performed by: PHYSICIAN ASSISTANT

## 2021-11-29 RX ORDER — ESCITALOPRAM OXALATE 10 MG/1
10 TABLET ORAL DAILY
Qty: 30 TABLET | Refills: 1 | Status: SHIPPED | OUTPATIENT
Start: 2021-11-29 | End: 2022-04-04 | Stop reason: ALTCHOICE

## 2021-11-29 RX ORDER — HYDROXYZINE 50 MG/1
50 TABLET, FILM COATED ORAL 3 TIMES DAILY PRN
Qty: 30 TABLET | Refills: 0 | Status: SHIPPED | OUTPATIENT
Start: 2021-11-29 | End: 2022-07-26

## 2022-01-04 ENCOUNTER — RA CDI HCC (OUTPATIENT)
Dept: OTHER | Facility: HOSPITAL | Age: 52
End: 2022-01-04

## 2022-01-04 NOTE — PROGRESS NOTES
[facility]  coding opportunities          Number of diagnosis code(s) already on the problem list added to I fla                     Patients insurance company: [employer])           With the beginning of the new year, this is a reminder to address all [facility]  (risk adjustment) codes for  as patient scores reset to zero MAGALY   F33 1 Moderate episode of recurrent major depressive disorder ([facility] )

## 2022-01-11 ENCOUNTER — OFFICE VISIT (OUTPATIENT)
Dept: FAMILY MEDICINE CLINIC | Facility: CLINIC | Age: 52
End: 2022-01-11
Payer: COMMERCIAL

## 2022-01-11 VITALS
TEMPERATURE: 96.9 F | RESPIRATION RATE: 18 BRPM | HEIGHT: 69 IN | HEART RATE: 82 BPM | BODY MASS INDEX: 30.51 KG/M2 | WEIGHT: 206 LBS | DIASTOLIC BLOOD PRESSURE: 96 MMHG | SYSTOLIC BLOOD PRESSURE: 152 MMHG | OXYGEN SATURATION: 96 %

## 2022-01-11 DIAGNOSIS — F41.1 GENERALIZED ANXIETY DISORDER: Primary | ICD-10-CM

## 2022-01-11 PROCEDURE — 1036F TOBACCO NON-USER: CPT | Performed by: PHYSICIAN ASSISTANT

## 2022-01-11 PROCEDURE — 99213 OFFICE O/P EST LOW 20 MIN: CPT | Performed by: PHYSICIAN ASSISTANT

## 2022-01-11 PROCEDURE — 3008F BODY MASS INDEX DOCD: CPT | Performed by: PHYSICIAN ASSISTANT

## 2022-01-11 RX ORDER — FLUOXETINE HYDROCHLORIDE 20 MG/1
20 CAPSULE ORAL DAILY
Qty: 90 CAPSULE | Refills: 1 | Status: SHIPPED | OUTPATIENT
Start: 2022-01-11 | End: 2022-04-04 | Stop reason: ALTCHOICE

## 2022-01-11 NOTE — ASSESSMENT & PLAN NOTE
Subjective improvement over the past 6 weeks  GAD7 score of 1, down from 10 after starting lexapro 10 mg    However, decreased energy since starting lexapro  Discussed changing to Prozac which may be more activating  - Ordered Prozac 20 mg   Pt will take Lexapro 5 mg for 7 days before d/c  FU PRN

## 2022-01-11 NOTE — PROGRESS NOTES
Assessment/Plan:    Generalized anxiety disorder  Subjective improvement over the past 6 weeks  GAD7 score of 1, down from 10 after starting lexapro 10 mg    However, decreased energy since starting lexapro  Discussed changing to Prozac which may be more activating  - Ordered Prozac 20 mg  Pt will take Lexapro 5 mg for 7 days before d/c  FU PRN             Subjective:    Patient ID: Ale Garcia  is a 45 y o  male  Pt is presenting today for 6 week FU of anxiety after starting Lexapro  Since starting his mood "has evened out" and he was sleeping beter  He does overall notice that since starting the Lexapro he has less energy and overall feeling tired  Used Atarax once which made him tired and euphoric  He does not want to or need to use it again  Depression  Associated symptoms include fatigue  Pertinent negatives include no chest pain, coughing, fever, nausea, neck pain, rash, sore throat or vomiting  The following portions of the patient's history were reviewed and updated as appropriate: allergies, current medications and problem list     Review of Systems   Constitutional: Positive for fatigue  Negative for activity change, fever and unexpected weight change  HENT: Negative for rhinorrhea and sore throat  Respiratory: Negative for cough, shortness of breath and wheezing  Cardiovascular: Negative for chest pain, palpitations and leg swelling  Gastrointestinal: Negative for constipation, diarrhea, nausea and vomiting  Musculoskeletal: Negative for back pain, neck pain and neck stiffness  Skin: Negative for rash  Psychiatric/Behavioral: Positive for depression  Objective:  /96 (BP Location: Left arm, Patient Position: Sitting, Cuff Size: Standard)   Pulse 82   Temp (!) 96 9 °F (36 1 °C)   Resp 18   Ht 5' 9" (1 753 m)   Wt 93 4 kg (206 lb)   SpO2 96%   BMI 30 42 kg/m²       Physical Exam  Vitals reviewed     Constitutional:       General: He is not in acute distress  Appearance: He is well-developed  He is obese  He is not diaphoretic  HENT:      Head: Normocephalic and atraumatic  Cardiovascular:      Rate and Rhythm: Normal rate and regular rhythm  Heart sounds: Normal heart sounds  No murmur heard  No friction rub  No gallop  Pulmonary:      Effort: Pulmonary effort is normal  No respiratory distress  Breath sounds: Normal breath sounds  No wheezing  Skin:     General: Skin is warm and dry  Neurological:      Mental Status: He is alert and oriented to person, place, and time  Psychiatric:         Behavior: Behavior normal          Thought Content:  Thought content normal

## 2022-03-28 ENCOUNTER — RA CDI HCC (OUTPATIENT)
Dept: OTHER | Facility: HOSPITAL | Age: 52
End: 2022-03-28

## 2022-03-28 NOTE — PROGRESS NOTES
[facility]  coding opportunities          Chart Reviewed number of suggestions sent to Provider: 1         With the beginning of the new year, this is a reminder to address all [facility]  (risk adjustment) codes for 2022 as patient scores reset to zero MAGALY   F33 1 Moderate episode of recurrent major depressive disorder ([facility] )       Patients Insurance        Commercial Insurance: [address] [place] [address]

## 2022-04-03 NOTE — PROGRESS NOTES
FAMILY MEDICINE PROGRESS NOTE    Date of Service: 22  Primary Care Provider:   Charity Louise MD       Name: Alicja Omalley  : 1970       Age:52 y o  Sex: male      MRN: [id number]      Chief Complaint:Medication Management (discuss medications)       ASSESSMENT and PLAN:  Alicja Omalley  is a 47 y o  male with:     Problem List Items Addressed This Visit        Other    Generalized anxiety disorder - Primary     Patient with good treatment response to Lexapro and Prozac however had undesirable side effects including fatigue and sexual side effects  Discussed resuming either Lexapro and Prozac and adding Wellbutrin, or switching to other medication  Patient opted to try alternative mediation, will start Effexor at 37 5 mg daily  Can increase to 75 mg daily if tolerated  Patient to call or send [email or web address] message in 4 weeks for update  Continue with therapy  Recommended regular exercise, healthy eating  Relevant Medications    venlafaxine (EFFEXOR-XR) 37 5 mg 24 hr capsule    Moderate episode of recurrent major depressive disorder (HCC)    Relevant Medications    venlafaxine (EFFEXOR-XR) 37 5 mg 24 hr capsule    Drug-induced erectile dysfunction     Drug induced from SSRI versus psychological versus vasogenic or some combination  Will trial Viagra, recommended starting with cutting 50 mg in half,         Relevant Medications    sildenafil (VIAGRA) 50 MG tablet          SUBJECTIVE:  Alicja Omalley  is a 47 y o  male who presents today with a chief complaint of Medication Management (discuss medications)  HPI     Patient presents today for follow-up  He was started on Prozac for anxiety in January  This was switched from Lexapro due to symptoms of fatigue  Patient reports that the Lexapro worked really well for his mood, however it caused a lot of fatigue  He did try to take the medication in the morning and at night, however even when taking Lexapro at night he had fatigue  He also had sexual side effects with Lexapro as well  He self discontinued Prozac in the last week due to sexual dysfunction  His primary mood disturbance is anxiety versus depression  He reports his sleep has been good  He was recently started on CPAP machine, which has improved his sleep significantly  Review of Systems   Constitutional: Positive for fatigue (from medications)  Genitourinary:        ED   Psychiatric/Behavioral: Positive for dysphoric mood  Negative for sleep disturbance  The patient is nervous/anxious  I have reviewed the patient's Past Medical History  Current Outpatient Medications:     hydrOXYzine HCL (ATARAX) 50 mg tablet, Take 1 tablet (50 mg total) by mouth 3 (three) times a day as needed for itching (Patient not taking: Reported on 4/4/2022 ), Disp: 30 tablet, Rfl: 0    sildenafil (VIAGRA) 50 MG tablet, Take 1 tablet (50 mg total) by mouth daily as needed for erectile dysfunction, Disp: 10 tablet, Rfl: 0    venlafaxine (EFFEXOR-XR) 37 5 mg 24 hr capsule, Take 1 capsule daily, if tolerated can increase to 2 capsules daily after two weeks, Disp: 30 capsule, Rfl: 3    OBJECTIVE:  /82 (BP Location: Left arm, Patient Position: Sitting, Cuff Size: Standard)   Pulse 87   Temp 97 9 °F (36 6 °C)   Resp 18   Ht 5' 9" (1 753 m)   Wt 93 4 kg (206 lb)   SpO2 97%   BMI 30 42 kg/m²    BP Readings from Last 3 Encounters:   04/04/22 122/82   01/11/22 152/96   11/29/21 120/80      Wt Readings from Last 3 Encounters:   04/04/22 93 4 kg (206 lb)   01/11/22 93 4 kg (206 lb)   11/29/21 93 kg (205 lb)      Physical Exam  Constitutional:       General: He is not in acute distress  Appearance: Normal appearance  He is not ill-appearing or toxic-appearing  HENT:      Head: Normocephalic and atraumatic        Right Ear: External ear normal       Left Ear: External ear normal       Nose: Nose normal       Mouth/Throat:      Mouth: Mucous membranes are moist    Eyes: Extraocular Movements: Extraocular movements intact  Conjunctiva/sclera: Conjunctivae normal    Pulmonary:      Effort: Pulmonary effort is normal  No respiratory distress  Musculoskeletal:      Cervical back: Normal range of motion and neck supple  No rigidity  Skin:     Findings: No erythema or rash  Neurological:      General: No focal deficit present  Mental Status: He is alert and oriented to person, place, and time  Psychiatric:         Attention and Perception: Attention normal          Mood and Affect: Mood and affect normal  Mood is not anxious or depressed  Affect is not flat or tearful  Behavior: Behavior normal  Behavior is cooperative  Return in about 5 months (around 9/4/2022) for Annual physical after August 24  Kelvin Chery MD    Note: Portions of the record have been created with voice recognition software  Occasional wrong word or "sound a like" substitutions may have occurred due to the inherent limitations of voice recognition software  Read the chart carefully and recognize, using context, where substitutions have occurred

## 2022-04-04 ENCOUNTER — OFFICE VISIT (OUTPATIENT)
Dept: FAMILY MEDICINE CLINIC | Facility: CLINIC | Age: 52
End: 2022-04-04
Payer: COMMERCIAL

## 2022-04-04 VITALS
BODY MASS INDEX: 30.51 KG/M2 | DIASTOLIC BLOOD PRESSURE: 82 MMHG | HEIGHT: 69 IN | HEART RATE: 87 BPM | SYSTOLIC BLOOD PRESSURE: 122 MMHG | TEMPERATURE: 97.9 F | OXYGEN SATURATION: 97 % | WEIGHT: 206 LBS | RESPIRATION RATE: 18 BRPM

## 2022-04-04 DIAGNOSIS — N52.2 DRUG-INDUCED ERECTILE DYSFUNCTION: ICD-10-CM

## 2022-04-04 DIAGNOSIS — F41.1 GENERALIZED ANXIETY DISORDER: Primary | ICD-10-CM

## 2022-04-04 DIAGNOSIS — F33.1 MODERATE EPISODE OF RECURRENT MAJOR DEPRESSIVE DISORDER (HCC): ICD-10-CM

## 2022-04-04 PROCEDURE — 1036F TOBACCO NON-USER: CPT | Performed by: FAMILY MEDICINE

## 2022-04-04 PROCEDURE — 99213 OFFICE O/P EST LOW 20 MIN: CPT | Performed by: FAMILY MEDICINE

## 2022-04-04 PROCEDURE — 3008F BODY MASS INDEX DOCD: CPT | Performed by: FAMILY MEDICINE

## 2022-04-04 RX ORDER — SILDENAFIL 50 MG/1
50 TABLET, FILM COATED ORAL DAILY PRN
Qty: 10 TABLET | Refills: 0 | Status: SHIPPED | OUTPATIENT
Start: 2022-04-04

## 2022-04-04 RX ORDER — VENLAFAXINE HYDROCHLORIDE 37.5 MG/1
CAPSULE, EXTENDED RELEASE ORAL
Qty: 30 CAPSULE | Refills: 3 | Status: SHIPPED | OUTPATIENT
Start: 2022-04-04 | End: 2022-08-03 | Stop reason: SDUPTHER

## 2022-04-04 NOTE — ASSESSMENT & PLAN NOTE
Patient with good treatment response to Lexapro and Prozac however had undesirable side effects including fatigue and sexual side effects  Discussed resuming either Lexapro and Prozac and adding Wellbutrin, or switching to other medication  Patient opted to try alternative mediation, will start Effexor at 37 5 mg daily  Can increase to 75 mg daily if tolerated  Patient to call or send [employer] message in 4 weeks for update  Continue with therapy  Recommended regular exercise, healthy eating

## 2022-04-04 NOTE — ASSESSMENT & PLAN NOTE
Drug induced from SSRI versus psychological versus vasogenic or some combination   Will trial Viagra, recommended starting with cutting 50 mg in half,

## 2022-07-26 ENCOUNTER — OFFICE VISIT (OUTPATIENT)
Dept: FAMILY MEDICINE CLINIC | Facility: CLINIC | Age: 52
End: 2022-07-26
Payer: COMMERCIAL

## 2022-07-26 VITALS
WEIGHT: 205 LBS | SYSTOLIC BLOOD PRESSURE: 148 MMHG | TEMPERATURE: 96 F | HEIGHT: 69 IN | HEART RATE: 73 BPM | OXYGEN SATURATION: 99 % | BODY MASS INDEX: 30.36 KG/M2 | DIASTOLIC BLOOD PRESSURE: 110 MMHG

## 2022-07-26 DIAGNOSIS — I10 ESSENTIAL HYPERTENSION: Primary | ICD-10-CM

## 2022-07-26 DIAGNOSIS — G47.33 OSA (OBSTRUCTIVE SLEEP APNEA): ICD-10-CM

## 2022-07-26 DIAGNOSIS — F41.1 GENERALIZED ANXIETY DISORDER: ICD-10-CM

## 2022-07-26 PROCEDURE — 99214 OFFICE O/P EST MOD 30 MIN: CPT | Performed by: FAMILY MEDICINE

## 2022-07-26 RX ORDER — LISINOPRIL 5 MG/1
5 TABLET ORAL DAILY
Qty: 30 TABLET | Refills: 0 | Status: SHIPPED | OUTPATIENT
Start: 2022-07-26 | End: 2022-08-25

## 2022-07-26 RX ORDER — FLUTICASONE PROPIONATE 50 MCG
1 SPRAY, SUSPENSION (ML) NASAL DAILY
COMMUNITY
End: 2022-08-25 | Stop reason: ALTCHOICE

## 2022-07-26 NOTE — PATIENT INSTRUCTIONS
Hypertension   WHAT YOU NEED TO KNOW:   What is hypertension? Hypertension is high blood pressure  Your blood pressure is the force of your blood moving against the walls of your arteries  Hypertension causes your blood pressure to get so high that your heart has to work much harder than normal  This can damage your heart  The cause of hypertension may not be known  This is called essential or primary hypertension  Hypertension caused by another medical condition, such as kidney disease, is called secondary hypertension  What do I need to know about the stages of hypertension? Normal blood pressure is 119/79 or lower   Your healthcare provider may only check your blood pressure each year if it stays at a normal level  Elevated blood pressure is 120/79 to 129/79   This is sometimes called prehypertension  Your healthcare provider may suggest lifestyle changes to help lower your blood pressure to a normal level  He or she may then check it again in 3 to 6 months  Stage 1 hypertension is 130/80  to 139/89   Your provider may recommend lifestyle changes, medication, and checks every 3 to 6 months until your blood pressure is controlled  Stage 2 hypertension is 140/90 or higher   Your provider will recommend lifestyle changes and have you take 2 kinds of hypertension medicines  You will also need to have your blood pressure checked monthly until it is controlled  What increases my risk for hypertension? Age older than 54 years (men) or 72 (women)    Stress, or a family history of hypertension or heart disease    Obesity, lack of exercise, or too many high-sodium foods    Use of tobacco, alcohol, or illegal drugs    A medical condition, such as diabetes, kidney disease, thyroid disease, or adrenal gland disorder    Certain medicines, such as steroids or birth control pills    What are the signs and symptoms of hypertension?   You may have no signs or symptoms, or you may have any of the following:  Headache    Blurred vision    Chest pain    Dizziness or weakness    Trouble breathing    Nosebleeds    How is hypertension diagnosed? Your healthcare provider will take your blood pressure at several visits  You may also need to check your blood pressure at home  The provider will examine you and ask what medicines you take  He or she will also ask if you have a family history of high blood pressure and about any health conditions you have  He or she will also check your blood pressure and weight and examine your heart, lungs, and eyes  You may need any of the following tests: An ambulatory blood pressure monitor (ABPM)  is a device that you wear  ABPM measures your blood pressure while you do your regular daily activities  It records your blood pressure every 15 to 30 minutes during the day  It also records your blood pressure every 15 minutes to 1 hour at night  The recorded blood pressures help your healthcare provider know if you have hypertension not seen at your appointment  Blood tests  may help healthcare providers find the cause of your hypertension  Blood tests can also help find other health problems caused by hypertension  Urine tests  will be done to check your kidney function  Kidney problems can increase your risk for hypertension  Which medicines are used to treat hypertension? Antihypertensives  may be used to help lower your blood pressure  Several kinds of medicines are available  Your healthcare provider will choose medicines based on the kind of hypertension you have  You may need more than one type of medicine  Take the medicine exactly as directed  Diuretics  help decrease extra fluid that collects in your body  This will help lower your blood pressure  You may urinate more often while you take this medicine  Cholesterol medicine  helps lower your cholesterol level   A low cholesterol level helps prevent heart disease and makes it easier to control your blood pressure  What can I do to manage hypertension? Check your blood pressure at home  Avoid smoking, caffeine, and exercise at least 30 minutes before checking your blood pressure  Sit and rest for 5 minutes before you take your blood pressure  Extend your arm and support it on a flat surface  Your arm should be at the same level as your heart  Follow the directions that came with your blood pressure monitor  Check your blood pressure 2 times, 1 minute apart, before you take your medicine in the morning  Also check your blood pressure before your evening meal  Keep a record of your readings and bring it to your follow-up visits  Ask your healthcare provider what your blood pressure should be  Manage any other health conditions you have  Health conditions such as diabetes can increase your risk for hypertension  Follow your healthcare provider's instructions and take all your medicines as directed  Ask about all medicines  Certain medicines can increase your blood pressure  Examples include oral birth control pills, decongestants, herbal supplements, and NSAIDs, such as ibuprofen  Your healthcare provider can tell you which medicines are safe for you to take  This includes prescription and over-the-counter medicines  What lifestyle changes can I make to manage hypertension? Your healthcare provider may recommend you work with a team to manage hypertension  The team may include medical experts such as a dietitian, an exercise or physical therapist, and a behavior therapist  Your family members may be included in helping you create lifestyle changes  Limit sodium (salt) as directed  Too much sodium can affect your fluid balance  Check labels to find low-sodium or no-salt-added foods  Some low-sodium foods use potassium salts for flavor  Too much potassium can also cause health problems  Your healthcare provider will tell you how much sodium and potassium are safe for you to have in a day   He or she may recommend that you limit sodium to 2,300 mg a day  Follow the meal plan recommended by your healthcare provider  A dietitian or your provider can give you more information on low-sodium plans or the DASH (Dietary Approaches to Stop Hypertension) eating plan  The DASH plan is low in sodium, processed sugar, unhealthy fats, and total fat  It is high in potassium, calcium, and fiber  These can be found in vegetables, fruit, and whole-grain foods  Be physically active throughout the day  Physical activity, such as exercise, can help control your blood pressure and your weight  Be physically active for at least 30 minutes per day, on most days of the week  Include aerobic activity, such as walking or riding a bicycle  Also include strength training at least 2 times each week  Your healthcare providers can help you create a physical activity plan  Decrease stress  This may help lower your blood pressure  Learn ways to relax, such as deep breathing or listening to music  Limit alcohol as directed  Alcohol can increase your blood pressure  A drink of alcohol is 12 ounces of beer, 5 ounces of wine, or 1½ ounces of liquor  Do not smoke  Nicotine and other chemicals in cigarettes and cigars can increase your blood pressure and also cause lung damage  Ask your healthcare provider for information if you currently smoke and need help to quit  E-cigarettes or smokeless tobacco still contain nicotine  Talk to your healthcare provider before you use these products  Call your local emergency number [phone] in the [address] [facility] or have someone call if:   You have chest pain      You have any of the following signs of a heart attack:      Squeezing, pressure, or pain in your chest    You may  also have any of the following:     Discomfort or pain in your back, neck, jaw, stomach, or arm    Shortness of breath    Nausea or vomiting    Lightheadedness or a sudden cold sweat    You become confused or have trouble speaking  You suddenly feel lightheaded or have trouble breathing  When should I seek immediate care? You have a severe headache or vision loss  You have weakness in an arm or leg  When should I call my doctor? You feel faint, dizzy, confused, or drowsy  You have been taking your blood pressure medicine but your pressure is higher than your provider says it should be  You have questions or concerns about your condition or care  CARE AGREEMENT:   You have the right to help plan your care  Learn about your health condition and how it may be treated  Discuss treatment options with your healthcare providers to decide what care you want to receive  You always have the right to refuse treatment  The above information is an  only  It is not intended as medical advice for individual conditions or treatments  Talk to your doctor, nurse or pharmacist before following any medical regimen to see if it is safe and effective for you  © Copyright [employer] 2022 Information is for End User's use only and may not be sold, redistributed or otherwise used for commercial purposes   All illustrations and images included in CareNotes® are the copyrighted property of A D A M , Inc  or [address] [place] [address]

## 2022-07-26 NOTE — ASSESSMENT & PLAN NOTE
Patient is wearing his CPAP but is unsure of the settings  Feels refreshed in the morning  MAKAYLA does put patient at risk for elevated blood pressure  Continue to monitor

## 2022-07-26 NOTE — LETTER
July 26, 2022     Patient: Burns Severin  YOB: 1970   Date of Visit: 7/26/2022       To Whom It May Concern: It is my medical opinion that Simeon Epley is safe for dental procedure while being treated for hypertension  If you have any questions or concerns, please don't hesitate to call           Sincerely,        Luca Guillermo MD    CC: No Recipients

## 2022-07-26 NOTE — PROGRESS NOTES
Assessment/Plan:    Essential hypertension  Will start patient on lisinopril 5 mg daily  Counseled patient on side effects of medication  Also ordered patient blood pressure cuff  Monitor blood pressure twice daily and keep a log  Patient will bring blood pressure log to his next visit  MAKAYLA (obstructive sleep apnea)  Patient is wearing his CPAP but is unsure of the settings  Feels refreshed in the morning  MAKAYLA does put patient at risk for elevated blood pressure  Continue to monitor  Generalized anxiety disorder  Improve while on Effexor 37 5 mg  No sexual side effects with this medication  This is the 1st medication has worked for patient's anxiety without sexual side effects  Continue at this time  Diagnoses and all orders for this visit:    Essential hypertension  -     lisinopril (ZESTRIL) 5 mg tablet; Take 1 tablet (5 mg total) by mouth daily  -     Blood Pressure Monitoring (Blood Pressure Monitor Automat) JONATAN; Use 2 (two) times a day    MAKAYLA (obstructive sleep apnea)    Generalized anxiety disorder    Other orders  -     fluticasone (FLONASE) 50 mcg/act nasal spray; 1 spray into each nostril in the morning          Subjective:      Patient ID: Janeen Tay  is a 47 y o  male  Patient presents to office today for dentist refused to fill cavities due to elevated blood pressure readings  Today blood pressure in office is 148/110  Patient denies any headaches changes in vision or chest pain at this time  States he is wearing his CPAP machine at night  Anxiety has been improving while on Effexor  The following portions of the patient's history were reviewed and updated as appropriate: allergies, current medications, past family history, past medical history, past social history, past surgical history and problem list     Review of Systems   Constitutional: Positive for fatigue  Negative for fever  HENT: Negative for sore throat  Eyes: Negative for visual disturbance  Respiratory: Negative for cough, chest tightness and shortness of breath  Cardiovascular: Negative for chest pain, palpitations and leg swelling  Gastrointestinal: Negative for abdominal pain, constipation, diarrhea and nausea  Endocrine: Negative for cold intolerance and heat intolerance  Genitourinary: Negative for flank pain  Musculoskeletal: Negative for back pain and neck pain  Skin: Negative for rash  Neurological: Negative for headaches  Psychiatric/Behavioral: Negative for behavioral problems, confusion, dysphoric mood and sleep disturbance  The patient is nervous/anxious  Objective:      BP (!) 148/110   Pulse 73   Temp (!) 96 °F (35 6 °C)   Ht 5' 9" (1 753 m)   Wt 93 kg (205 lb)   SpO2 99%   BMI 30 27 kg/m²          Physical Exam  Vitals and nursing note reviewed  Constitutional:       Appearance: He is well-developed  HENT:      Head: Normocephalic and atraumatic  Nose: Nose normal    Eyes:      Pupils: Pupils are equal, round, and reactive to light  Cardiovascular:      Rate and Rhythm: Normal rate and regular rhythm  Pulmonary:      Effort: Pulmonary effort is normal       Breath sounds: Normal breath sounds  Abdominal:      General: Bowel sounds are normal       Palpations: Abdomen is soft  Musculoskeletal:         General: Normal range of motion  Cervical back: Normal range of motion  Skin:     General: Skin is warm and dry  Neurological:      Mental Status: He is alert and oriented to person, place, and time     Psychiatric:         Mood and Affect: Mood normal          Behavior: Behavior normal

## 2022-07-26 NOTE — ASSESSMENT & PLAN NOTE
Will start patient on lisinopril 5 mg daily  Counseled patient on side effects of medication  Also ordered patient blood pressure cuff  Monitor blood pressure twice daily and keep a log  Patient will bring blood pressure log to his next visit

## 2022-08-03 DIAGNOSIS — F41.1 GENERALIZED ANXIETY DISORDER: ICD-10-CM

## 2022-08-03 RX ORDER — VENLAFAXINE HYDROCHLORIDE 37.5 MG/1
CAPSULE, EXTENDED RELEASE ORAL
Qty: 30 CAPSULE | Refills: 0 | Status: SHIPPED | OUTPATIENT
Start: 2022-08-03 | End: 2022-10-20 | Stop reason: ALTCHOICE

## 2022-08-23 NOTE — PROGRESS NOTES
FAMILY MEDICINE PROGRESS NOTE    Date of Service: 22  Primary Care Provider:   Jay Peoples MD       Name: Inés Yu  : 1970       Age:52 y o  Sex: male      MRN: [id number]      Chief Complaint:Hypertension       ASSESSMENT and PLAN:  Inés Yu  is a 47 y o  male with:     Problem List Items Addressed This Visit        Respiratory    MAKAYLA (obstructive sleep apnea)     Follows with sleep medicine, regularly uses CPAP  Symptoms of hypertension, daily headache are somewhat concerning for inadequately treated sleep apnea though reportedly no problems with the machine, and there were no issues at his last sleep medicine appointment within the last 4-5 months            Cardiovascular and Mediastinum    Primary hypertension - Primary     BP Readings from Last 3 Encounters:   22 122/84   22 (!) 148/110   22 122/82     Improved blood pressure control, though home readings remain elevated, will increase to 10 mg daily          Relevant Medications    lisinopril (ZESTRIL) 10 mg tablet    Other Relevant Orders    Comprehensive metabolic panel    CBC and differential    TSH, 3rd generation with Free T4 reflex    Lipid panel       Other    Generalized anxiety disorder    Relevant Medications    buPROPion (Wellbutrin XL) 150 mg 24 hr tablet    Moderate episode of recurrent major depressive disorder (HCC)     Symptoms of lack of motivation, decreased concentration likely due to depressive symptoms verses ADHD  He does have a lot of current life stressors which are likely contributing  He does not feel so the Effexor was helping  Discussed the possibility of increasing Effexor versus trying a new medication given symptoms of fatigue and difficulty concentrating he would like to try Wellbutrin, will stop Effexor and start Wellbutrin    Discussed importance of lifestyle modifications including good sleep schedule, healthy eating and regular physical activity ideally done outdoors to improve mood symptoms  Relevant Medications    buPROPion (Wellbutrin XL) 150 mg 24 hr tablet    Drug-induced erectile dysfunction          SUBJECTIVE:  Ankita Linn  is a 47 y o  male who presents today with a chief complaint of Hypertension  HPI     Hypertension   Patient was started on lisinopril 5 mg at the end of July for uncontrolled hypertension  He has been checking home readings  Range of readings at home are 114//105  He has sleep apnea, was diagnosed in the last year  He follows with sleep medicine, regularly uses CPAP has no issues  Depression/Anxiety  He was seen in April and started on Effexor for anxiety  He has had a lot of stressors with getting , recently moved  He reports that he is constantly tired, will sleep all day if he could  He has a hard time getting up in the morning, difficulty concentrating  He has a hard time motivating himself to do things, he is easily distracted  He reports he sleeps well, but he is still tired in the morning  He reports that he regularly uses CPAP machine  He has no trouble falling asleep and does not generally wake up at night  He just has a hard time waking up in the morning  He is wondering if the Effexor is making him tired  He had similar issues with Lexapro  He would like to try Wellbutrin  Review of Systems   Constitutional: Positive for fatigue  Psychiatric/Behavioral: Positive for decreased concentration  Negative for dysphoric mood and sleep disturbance  I have reviewed the patient's Past Medical History      Current Outpatient Medications:     Blood Pressure Monitoring (Blood Pressure Monitor Automat) JONATAN, Use 2 (two) times a day, Disp: 1 each, Rfl: 0    buPROPion (Wellbutrin XL) 150 mg 24 hr tablet, Take 1 tablet (150 mg total) by mouth every morning, Disp: 90 tablet, Rfl: 3    Cetirizine HCl (ZYRTEC ALLERGY PO), Take by mouth daily, Disp: , Rfl:     lisinopril (ZESTRIL) 10 mg tablet, Take 1 tablet (10 mg total) by mouth daily, Disp: 90 tablet, Rfl: 0    venlafaxine (EFFEXOR-XR) 37 5 mg 24 hr capsule, Take 1 capsule daily, if tolerated can increase to 2 capsules daily after two weeks, Disp: 30 capsule, Rfl: 0    OBJECTIVE:  /84   Pulse 97   Temp (!) 96 2 °F (35 7 °C)   Resp 17   Ht 5' 9" (1 753 m)   Wt 92 8 kg (204 lb 8 oz)   SpO2 98%   BMI 30 20 kg/m²    BP Readings from Last 3 Encounters:   08/25/22 122/84   07/26/22 (!) 148/110   04/04/22 122/82      Wt Readings from Last 3 Encounters:   08/25/22 92 8 kg (204 lb 8 oz)   07/26/22 93 kg (205 lb)   04/04/22 93 4 kg (206 lb)      Physical Exam  Constitutional:       General: He is not in acute distress  Appearance: Normal appearance  He is obese  He is not ill-appearing or toxic-appearing  HENT:      Head: Normocephalic and atraumatic  Right Ear: External ear normal       Left Ear: External ear normal       Nose: Nose normal       Mouth/Throat:      Mouth: Mucous membranes are moist    Eyes:      Extraocular Movements: Extraocular movements intact  Conjunctiva/sclera: Conjunctivae normal    Pulmonary:      Effort: Pulmonary effort is normal  No respiratory distress  Musculoskeletal:      Cervical back: Normal range of motion and neck supple  No rigidity  Skin:     Findings: No erythema or rash  Neurological:      General: No focal deficit present  Mental Status: He is alert and oriented to person, place, and time     Psychiatric:         Mood and Affect: Mood normal          Behavior: Behavior normal            Lab Results   Component Value Date    SODIUM 138 08/27/2021    K 4 0 08/27/2021     08/27/2021    CO2 28 08/27/2021    BUN 15 08/27/2021    CREATININE 1 11 08/27/2021    CALCIUM 9 3 08/27/2021     Lab Results   Component Value Date    ALT 22 08/27/2021    AST 12 08/27/2021    ALKPHOS 59 08/27/2021     Lab Results   Component Value Date    CHOLESTEROL 205 (H) 08/27/2021     Lab Results Component Value Date    HDL 41 08/27/2021     Lab Results   Component Value Date    TRIG 191 (H) 08/27/2021     No results found for: Jasmin  Lab Results   Component Value Date    LDLCALC 126 (H) 08/27/2021     Lab Results   Component Value Date    HGBA1C 5 4 08/27/2021              Return in about 8 weeks (around 10/20/2022) for Annual physical 6 to 8 weeks   Viola Schroeder MD    Note: Portions of the record have been created with voice recognition software  Occasional wrong word or "sound a like" substitutions may have occurred due to the inherent limitations of voice recognition software  Read the chart carefully and recognize, using context, where substitutions have occurred

## 2022-08-23 NOTE — ASSESSMENT & PLAN NOTE
BP Readings from Last 3 Encounters:   08/25/22 122/84   07/26/22 (!) 148/110   04/04/22 122/82     Improved blood pressure control, though home readings remain elevated, will increase to 10 mg daily

## 2022-08-25 ENCOUNTER — OFFICE VISIT (OUTPATIENT)
Dept: FAMILY MEDICINE CLINIC | Facility: CLINIC | Age: 52
End: 2022-08-25
Payer: COMMERCIAL

## 2022-08-25 VITALS
HEART RATE: 97 BPM | OXYGEN SATURATION: 98 % | HEIGHT: 69 IN | TEMPERATURE: 96.2 F | DIASTOLIC BLOOD PRESSURE: 84 MMHG | SYSTOLIC BLOOD PRESSURE: 122 MMHG | RESPIRATION RATE: 17 BRPM | WEIGHT: 204.5 LBS | BODY MASS INDEX: 30.29 KG/M2

## 2022-08-25 DIAGNOSIS — I10 PRIMARY HYPERTENSION: Primary | ICD-10-CM

## 2022-08-25 DIAGNOSIS — G47.33 OSA (OBSTRUCTIVE SLEEP APNEA): ICD-10-CM

## 2022-08-25 DIAGNOSIS — F41.1 GENERALIZED ANXIETY DISORDER: ICD-10-CM

## 2022-08-25 DIAGNOSIS — N52.2 DRUG-INDUCED ERECTILE DYSFUNCTION: ICD-10-CM

## 2022-08-25 DIAGNOSIS — F33.1 MODERATE EPISODE OF RECURRENT MAJOR DEPRESSIVE DISORDER (HCC): ICD-10-CM

## 2022-08-25 PROCEDURE — 99214 OFFICE O/P EST MOD 30 MIN: CPT | Performed by: FAMILY MEDICINE

## 2022-08-25 RX ORDER — BUPROPION HYDROCHLORIDE 150 MG/1
150 TABLET ORAL EVERY MORNING
Qty: 90 TABLET | Refills: 3 | Status: SHIPPED | OUTPATIENT
Start: 2022-08-25

## 2022-08-25 RX ORDER — LISINOPRIL 5 MG/1
5 TABLET ORAL DAILY
Qty: 90 TABLET | Refills: 0 | Status: SHIPPED | OUTPATIENT
Start: 2022-08-25 | End: 2022-08-25

## 2022-08-25 RX ORDER — LISINOPRIL 10 MG/1
10 TABLET ORAL DAILY
Qty: 90 TABLET | Refills: 0 | Status: SHIPPED | OUTPATIENT
Start: 2022-08-25 | End: 2022-10-20 | Stop reason: SDUPTHER

## 2022-08-25 NOTE — ASSESSMENT & PLAN NOTE
Follows with sleep medicine, regularly uses CPAP  Symptoms of hypertension, daily headache are somewhat concerning for inadequately treated sleep apnea though reportedly no problems with the machine, and there were no issues at his last sleep medicine appointment within the last 4-5 months

## 2022-10-13 ENCOUNTER — RA CDI HCC (OUTPATIENT)
Dept: OTHER | Facility: HOSPITAL | Age: 52
End: 2022-10-13

## 2022-10-13 NOTE — PROGRESS NOTES
[facility]  coding opportunities       Chart reviewed, no opportunity found: CHART REVIEWED, NO OPPORTUNITY FOUND        Patients Insurance        Commercial Insurance: [address] [place] [address]

## 2022-10-19 NOTE — PROGRESS NOTES
ANNUAL PHYSICAL    Date of Service: 10/20/22  Primary Care Provider:   Mando Carbajal MD       Name: Jaelyn Ziegler  : 1970       Age:52 y o  Sex: male      MRN: [id number]      Chief Complaint:Physical Exam       Assessment and Plan:  47 y o  male exam      1  Health Maintenance  - Colon Cancer Screening:  Last colonoscopy in 2021, 10 year follow-up recommended  - Prostate Cancer Screen: Reviewed risks/benefits of screening and patient declines at this time  - Labs: pending  - Immunizations: Reviewed  Recommend yearly flu vaccine  2  Other diagnoses addressed today:   Problem List Items Addressed This Visit        Respiratory    MAKAYLA (obstructive sleep apnea)     Continue CPAP             Cardiovascular and Mediastinum    Primary hypertension     BP Readings from Last 3 Encounters:   10/20/22 134/92   22 122/84   22 (!) 148/110     Controlled, continue Lisinopril 10 mg daily         Relevant Medications    lisinopril (ZESTRIL) 10 mg tablet       Other    Generalized anxiety disorder    Moderate episode of recurrent major depressive disorder (HCC)     Mood symptoms have improved, still with some low mood/depressive symptoms, but overall better than before  Will continue Wellbutrin 150 mg daily, discussed possibility of increasing dose of medication, patient would like to continue current regimem           Other Visit Diagnoses     Annual physical exam    -  Primary           Immunizations and preventive care screenings were discussed with patient today  Appropriate education was printed on patient's after visit summary  Counseling:  Alcohol/drug use: discussed moderation in alcohol intake, the recommendations for healthy alcohol use, and avoidance of illicit drug use  Dental Health: discussed importance of regular tooth brushing, flossing, and dental visits      Injury prevention: discussed safety/seat belts, safety helmets, smoke detectors, carbon dioxide detectors    Exercise: the importance of regular exercise/physical activity was discussed  Recommend exercise 3-5 times per week for at least 30 minutes  BMI Counseling: Body mass index is 30 57 kg/m²  The BMI is above normal  Nutrition recommendations include decreasing portion sizes, encouraging healthy choices of fruits and vegetables, consuming healthier snacks and reducing intake of saturated and trans fat  Exercise recommendations include moderate physical activity 150 minutes/week and strength training exercises  Rationale for BMI follow-up plan is due to patient being overweight or obese  Subjective:    Celine Andrews  is a 47 y o  male and is here for his comprehensive physical exam      Acute complaints:     Patient was last seen in August for follow-up of hypertension  He had previously been seen at the in July for elevated blood pressure and was started on lisinopril 5 mg at that time  His dose was increased to lisinopril 10 mg his last visit  He was checking his blood pressure at home    In addition he was switched from Effexor to Wellbutrin for anxiety and depression at his last visit  Diet and Physical Activity  Diet/Nutrition: does follow a well balanced diet  Exercise: walks 3 miles about 3 days a week     General Health  Vision: no vision problems and goes for regular eye exams  Dental: regular dental visits          Histories Updated and Reviewed 10/20/2022:  Patient's Medications   New Prescriptions    No medications on file   Previous Medications    BLOOD PRESSURE MONITORING (BLOOD PRESSURE MONITOR AUTOMAT) JONATAN    Use 2 (two) times a day    BUPROPION (WELLBUTRIN XL) 150 MG 24 HR TABLET    Take 1 tablet (150 mg total) by mouth every morning    CETIRIZINE HCL (ZYRTEC ALLERGY PO)    Take by mouth daily   Modified Medications    Modified Medication Previous Medication    LISINOPRIL (ZESTRIL) 10 MG TABLET lisinopril (ZESTRIL) 10 mg tablet       Take 1 tablet (10 mg total) by mouth daily    Take 1 tablet (10 mg total) by mouth daily   Discontinued Medications    VENLAFAXINE (EFFEXOR-XR) 37 5 MG 24 HR CAPSULE    Take 1 capsule daily, if tolerated can increase to 2 capsules daily after two weeks     Allergies   Allergen Reactions   • Iodinated Diagnostic Agents Hives     Past Medical History:   Diagnosis Date   • Colon polyp    • Seasonal allergies    • Sleep apnea     newly diagnosed    • Umbilical hernia      Social History     Socioeconomic History   • Marital status: /Civil Union     Spouse name: Not on file   • Number of children: Not on file   • Years of education: Not on file   • Highest education level: Not on file   Occupational History   • Not on file   Tobacco Use   • Smoking status: Never Smoker   • Smokeless tobacco: Never Used   Vaping Use   • Vaping Use: Never used   Substance and Sexual Activity   • Alcohol use: Yes     Comment: social    • Drug use: Never   • Sexual activity: Not Currently   Other Topics Concern   • Not on file   Social History Narrative   • Not on file     Social Determinants of Health     Financial Resource Strain: Not on file   Food Insecurity: Not on file   Transportation Needs: Not on file   Physical Activity: Not on file   Stress: Not on file   Social Connections: Not on file   Intimate Partner Violence: Not on file   Housing Stability: Not on file     Immunization History   Administered Date(s) Administered   • COVID-19 MODERNA VACC 0 5 ML IM 2021, 2021, 2022   • Influenza, seasonal, injectable 2001, 2008       PHQ-2/9 Depression Screening    Little interest or pleasure in doing things: 0 - not at all  Feeling down, depressed, or hopeless: 1 - several days  Trouble falling or staying asleep, or sleeping too much: 0 - not at all  Feeling tired or having little energy: 1 - several days  Poor appetite or overeatin - not at all  Feeling bad about yourself - or that you are a failure or have let yourself or your family down: 1 - several days  Trouble concentrating on things, such as reading the newspaper or watching television: 1 - several days  Moving or speaking so slowly that other people could have noticed  Or the opposite - being so fidgety or restless that you have been moving around a lot more than usual: 0 - not at all  Thoughts that you would be better off dead, or of hurting yourself in some way: 0 - not at all  PHQ-9 Score: 4   PHQ-9 Interpretation: No or Minimal depression          Objective:  /92   Pulse (!) 116   Temp 97 6 °F (36 4 °C)   Resp 16   Ht 5' 9" (1 753 m)   Wt 93 9 kg (207 lb)   SpO2 96%   BMI 30 57 kg/m²   BP Readings from Last 3 Encounters:   10/20/22 134/92   08/25/22 122/84   07/26/22 (!) 148/110      Wt Readings from Last 3 Encounters:   10/20/22 93 9 kg (207 lb)   08/25/22 92 8 kg (204 lb 8 oz)   07/26/22 93 kg (205 lb)      Physical Exam  Constitutional:       General: He is not in acute distress  Appearance: Normal appearance  He is not ill-appearing or toxic-appearing  HENT:      Head: Normocephalic and atraumatic  Right Ear: Tympanic membrane, ear canal and external ear normal       Left Ear: Tympanic membrane, ear canal and external ear normal       Nose: Nose normal       Mouth/Throat:      Mouth: Mucous membranes are moist    Eyes:      Extraocular Movements: Extraocular movements intact  Conjunctiva/sclera: Conjunctivae normal    Cardiovascular:      Rate and Rhythm: Normal rate and regular rhythm  Heart sounds: Normal heart sounds  Pulmonary:      Effort: Pulmonary effort is normal  No respiratory distress  Breath sounds: Normal breath sounds  Abdominal:      General: There is no distension  Palpations: Abdomen is soft  Musculoskeletal:      Cervical back: Normal range of motion and neck supple  No rigidity  Left lower leg: No edema  Skin:     Findings: No erythema or rash     Neurological:      General: No focal deficit present  Mental Status: He is alert and oriented to person, place, and time  Psychiatric:         Mood and Affect: Mood normal          Behavior: Behavior normal          Patient Care Team:  Fernanda Lewis MD as PCP - General (Family Medicine)  Duy Green BayMD Fernanda    Note: Portions of the record may have been created with voice recognition software  Occasional wrong word or "sound a like" substitutions may have occurred due to the inherent limitations of voice recognition software  Read the chart carefully and recognize, using context, where substitutions have occurred

## 2022-10-19 NOTE — ASSESSMENT & PLAN NOTE
BP Readings from Last 3 Encounters:   10/20/22 134/92   08/25/22 122/84   07/26/22 (!) 148/110     Controlled, continue Lisinopril 10 mg daily

## 2022-10-20 ENCOUNTER — OFFICE VISIT (OUTPATIENT)
Dept: FAMILY MEDICINE CLINIC | Facility: CLINIC | Age: 52
End: 2022-10-20
Payer: COMMERCIAL

## 2022-10-20 VITALS
HEIGHT: 69 IN | TEMPERATURE: 97.6 F | SYSTOLIC BLOOD PRESSURE: 134 MMHG | WEIGHT: 207 LBS | RESPIRATION RATE: 16 BRPM | OXYGEN SATURATION: 96 % | BODY MASS INDEX: 30.66 KG/M2 | DIASTOLIC BLOOD PRESSURE: 92 MMHG | HEART RATE: 116 BPM

## 2022-10-20 DIAGNOSIS — F33.1 MODERATE EPISODE OF RECURRENT MAJOR DEPRESSIVE DISORDER (HCC): ICD-10-CM

## 2022-10-20 DIAGNOSIS — I10 PRIMARY HYPERTENSION: ICD-10-CM

## 2022-10-20 DIAGNOSIS — Z00.00 ANNUAL PHYSICAL EXAM: Primary | ICD-10-CM

## 2022-10-20 DIAGNOSIS — F41.1 GENERALIZED ANXIETY DISORDER: ICD-10-CM

## 2022-10-20 DIAGNOSIS — G47.33 OSA (OBSTRUCTIVE SLEEP APNEA): ICD-10-CM

## 2022-10-20 PROCEDURE — 99396 PREV VISIT EST AGE 40-64: CPT | Performed by: FAMILY MEDICINE

## 2022-10-20 RX ORDER — LISINOPRIL 10 MG/1
10 TABLET ORAL DAILY
Qty: 90 TABLET | Refills: 3 | Status: SHIPPED | OUTPATIENT
Start: 2022-10-20 | End: 2022-11-19

## 2022-10-20 NOTE — ASSESSMENT & PLAN NOTE
Mood symptoms have improved, still with some low mood/depressive symptoms, but overall better than before   Will continue Wellbutrin 150 mg daily, discussed possibility of increasing dose of medication, patient would like to continue current regimem

## 2022-11-03 ENCOUNTER — PATIENT MESSAGE (OUTPATIENT)
Dept: FAMILY MEDICINE CLINIC | Facility: CLINIC | Age: 52
End: 2022-11-03

## 2022-11-03 DIAGNOSIS — F33.1 MODERATE EPISODE OF RECURRENT MAJOR DEPRESSIVE DISORDER (HCC): ICD-10-CM

## 2022-11-03 RX ORDER — BUPROPION HYDROCHLORIDE 300 MG/1
300 TABLET ORAL EVERY MORNING
Qty: 90 TABLET | Refills: 1 | Status: SHIPPED | OUTPATIENT
Start: 2022-11-03

## 2022-11-14 DIAGNOSIS — I10 PRIMARY HYPERTENSION: ICD-10-CM

## 2022-11-14 RX ORDER — LISINOPRIL 10 MG/1
10 TABLET ORAL DAILY
Qty: 90 TABLET | Refills: 3 | Status: SHIPPED | OUTPATIENT
Start: 2022-11-14 | End: 2022-12-14

## 2022-12-12 ENCOUNTER — TELEMEDICINE (OUTPATIENT)
Dept: FAMILY MEDICINE CLINIC | Facility: CLINIC | Age: 52
End: 2022-12-12

## 2022-12-12 DIAGNOSIS — J06.9 UPPER RESPIRATORY TRACT INFECTION, UNSPECIFIED TYPE: Primary | ICD-10-CM

## 2022-12-12 NOTE — PROGRESS NOTES
COVID-19 Outpatient Progress Note    Assessment/Plan:    Problem List Items Addressed This Visit    None  Visit Diagnoses     Upper respiratory tract infection, unspecified type    -  Primary      Complete Augmentin through 12/15 as initially prescribed by urgent care  Continue Mucinex DM and start Flonase for sinus congestion  If no improvement by Thursday would like to see patient in office to reevaluate  Did discuss with patient that failure to improve with antibiotic would likely mean viral infection  Recommend sinus rinses and Flonase for ear pressure  Patient understands and agrees with plan    Disposition:     I have spent 15 minutes directly with the patient  Encounter provider: Maria E Vizcaino MD     Provider located at: [place] [address]  [phone]     Recent Visits  No visits were found meeting these conditions  Showing recent visits within past 7 days and meeting all other requirements  Today's Visits  Date Type Provider Dept   12/12/22 Telemedicine Maria E Vizcaino MD [facility]   Showing today's visits and meeting all other requirements  Future Appointments  No visits were found meeting these conditions  Showing future appointments within next 150 days and meeting all other requirements     This virtual check-in was done via [facility] and patient was informed that this is a secure, HIPAA-compliant platform  He agrees to proceed  Patient agrees to participate in a virtual check in via telephone or video visit instead of presenting to the office to address urgent/immediate medical needs  Patient is aware this is a billable service  He acknowledged consent and understanding of privacy and security of the video platform  The patient has agreed to participate and understands they can discontinue the visit at any time  After connecting through [facility], the patient was identified by name and date of birth  Dutch Irwin  was informed that this was a telemedicine visit and that the exam was being conducted confidentially over secure lines  Dutch Irwin  acknowledged consent and understanding of privacy and security of the telemedicine visit  I informed the patient that I have reviewed his record in Epic and presented the opportunity for him to ask any questions regarding the visit today  The patient agreed to participate  Verification of patient location:  Patient is located in the following state in which I hold an active license: [place]    Subjective:   Dutch Irwin  is a 47 y o  male who is concerned about COVID-19  Patient's symptoms include nasal congestion, sore throat and cough  Patient denies fever      - Date of symptom onset: 12/5/2022      COVID-19 vaccination status: Fully vaccinated with booster    Sinus pressure and pain  Feels like its in his chest at this point  [facility]  Was given Augmentin and told to start Mucinex  Patient is felt no improvement since starting this medication  Ear feels blocked and closed  No results found for: SARSCOV2, [address] [facility], [address] [facility], CORONAVIRUSR, [address] [facility], [address] [facility]    Review of Systems   Constitutional: Negative for fever  HENT: Positive for congestion, ear pain and sore throat  Respiratory: Positive for cough  Current Outpatient Medications on File Prior to Visit   Medication Sig   • buPROPion (Wellbutrin XL) 300 mg 24 hr tablet Take 1 tablet (300 mg total) by mouth every morning   • Blood Pressure Monitoring (Blood Pressure Monitor Automat) JONATAN Use 2 (two) times a day   • Cetirizine HCl (ZYRTEC ALLERGY PO) Take by mouth daily   • lisinopril (ZESTRIL) 10 mg tablet Take 1 tablet (10 mg total) by mouth daily       Objective: There were no vitals taken for this visit  Physical Exam  Constitutional:       Appearance: Normal appearance  Pulmonary:      Effort: No respiratory distress     Neurological: Mental Status: He is alert         Fortunato Spicer MD

## 2023-04-21 ENCOUNTER — APPOINTMENT (OUTPATIENT)
Dept: LAB | Facility: CLINIC | Age: 53
End: 2023-04-21

## 2023-04-21 DIAGNOSIS — Z11.3 SCREENING FOR STDS (SEXUALLY TRANSMITTED DISEASES): ICD-10-CM

## 2023-04-21 DIAGNOSIS — I10 PRIMARY HYPERTENSION: ICD-10-CM

## 2023-04-21 LAB
ALBUMIN SERPL BCP-MCNC: 3.7 G/DL (ref 3.5–5)
ALP SERPL-CCNC: 60 U/L (ref 46–116)
ALT SERPL W P-5'-P-CCNC: 25 U/L (ref 12–78)
ANION GAP SERPL CALCULATED.3IONS-SCNC: 1 MMOL/L (ref 4–13)
AST SERPL W P-5'-P-CCNC: 16 U/L (ref 5–45)
BASOPHILS # BLD AUTO: 0.04 THOUSANDS/ΜL (ref 0–0.1)
BASOPHILS NFR BLD AUTO: 1 % (ref 0–1)
BILIRUB SERPL-MCNC: 0.48 MG/DL (ref 0.2–1)
BUN SERPL-MCNC: 25 MG/DL (ref 5–25)
CALCIUM SERPL-MCNC: 9.2 MG/DL (ref 8.3–10.1)
CHLORIDE SERPL-SCNC: 107 MMOL/L (ref 96–108)
CHOLEST SERPL-MCNC: 164 MG/DL
CO2 SERPL-SCNC: 29 MMOL/L (ref 21–32)
CREAT SERPL-MCNC: 1.25 MG/DL (ref 0.6–1.3)
EOSINOPHIL # BLD AUTO: 0.14 THOUSAND/ΜL (ref 0–0.61)
EOSINOPHIL NFR BLD AUTO: 2 % (ref 0–6)
ERYTHROCYTE [DISTWIDTH] IN BLOOD BY AUTOMATED COUNT: 12.5 % (ref 11.6–15.1)
GFR SERPL CREATININE-BSD FRML MDRD: 64 ML/MIN/1.73SQ M
GLUCOSE P FAST SERPL-MCNC: 97 MG/DL (ref 65–99)
HCT VFR BLD AUTO: 48.5 % (ref 36.5–49.3)
HDLC SERPL-MCNC: 41 MG/DL
HGB BLD-MCNC: 16.3 G/DL (ref 12–17)
HIV 1+2 AB+HIV1 P24 AG SERPL QL IA: NORMAL
HIV 2 AB SERPL QL IA: NORMAL
HIV1 AB SERPL QL IA: NORMAL
HIV1 P24 AG SERPL QL IA: NORMAL
IMM GRANULOCYTES # BLD AUTO: 0.01 THOUSAND/UL (ref 0–0.2)
IMM GRANULOCYTES NFR BLD AUTO: 0 % (ref 0–2)
LDLC SERPL CALC-MCNC: 89 MG/DL (ref 0–100)
LYMPHOCYTES # BLD AUTO: 1.83 THOUSANDS/ΜL (ref 0.6–4.47)
LYMPHOCYTES NFR BLD AUTO: 28 % (ref 14–44)
MCH RBC QN AUTO: 29.9 PG (ref 26.8–34.3)
MCHC RBC AUTO-ENTMCNC: 33.6 G/DL (ref 31.4–37.4)
MCV RBC AUTO: 89 FL (ref 82–98)
MONOCYTES # BLD AUTO: 0.57 THOUSAND/ΜL (ref 0.17–1.22)
MONOCYTES NFR BLD AUTO: 9 % (ref 4–12)
NEUTROPHILS # BLD AUTO: 3.85 THOUSANDS/ΜL (ref 1.85–7.62)
NEUTS SEG NFR BLD AUTO: 60 % (ref 43–75)
NONHDLC SERPL-MCNC: 123 MG/DL
NRBC BLD AUTO-RTO: 0 /100 WBCS
PLATELET # BLD AUTO: 198 THOUSANDS/UL (ref 149–390)
PMV BLD AUTO: 10.4 FL (ref 8.9–12.7)
POTASSIUM SERPL-SCNC: 5.5 MMOL/L (ref 3.5–5.3)
PROT SERPL-MCNC: 6.7 G/DL (ref 6.4–8.4)
RBC # BLD AUTO: 5.46 MILLION/UL (ref 3.88–5.62)
SODIUM SERPL-SCNC: 137 MMOL/L (ref 135–147)
TRIGL SERPL-MCNC: 171 MG/DL
TSH SERPL DL<=0.05 MIU/L-ACNC: 1.43 UIU/ML (ref 0.45–4.5)
WBC # BLD AUTO: 6.44 THOUSAND/UL (ref 4.31–10.16)

## 2023-04-23 DIAGNOSIS — E87.5 HYPERKALEMIA: Primary | ICD-10-CM

## 2023-05-04 ENCOUNTER — APPOINTMENT (OUTPATIENT)
Dept: LAB | Facility: CLINIC | Age: 53
End: 2023-05-04

## 2023-05-04 DIAGNOSIS — E87.5 HYPERKALEMIA: ICD-10-CM

## 2023-05-04 LAB
ANION GAP SERPL CALCULATED.3IONS-SCNC: 5 MMOL/L (ref 4–13)
BUN SERPL-MCNC: 18 MG/DL (ref 5–25)
CALCIUM SERPL-MCNC: 9.3 MG/DL (ref 8.3–10.1)
CHLORIDE SERPL-SCNC: 109 MMOL/L (ref 96–108)
CO2 SERPL-SCNC: 27 MMOL/L (ref 21–32)
CREAT SERPL-MCNC: 1.17 MG/DL (ref 0.6–1.3)
GFR SERPL CREATININE-BSD FRML MDRD: 70 ML/MIN/1.73SQ M
GLUCOSE P FAST SERPL-MCNC: 87 MG/DL (ref 65–99)
POTASSIUM SERPL-SCNC: 4.2 MMOL/L (ref 3.5–5.3)
SODIUM SERPL-SCNC: 141 MMOL/L (ref 135–147)

## 2023-05-09 NOTE — ASSESSMENT & PLAN NOTE
BP Readings from Last 3 Encounters:   05/10/23 118/82   10/20/22 134/92   08/25/22 122/84     Controlled, continue Lisinopril 10 mg daily

## 2023-05-09 NOTE — PROGRESS NOTES
FAMILY MEDICINE PROGRESS NOTE    Date of Service: 05/10/23  Primary Care Provider:   Emmett Zaidi MD       Name: Coni Mehta  : 1970       Age:53 y o  Sex: male      MRN: [id number]      Chief Complaint:Follow-up (6 mo) and Physical Exam       ASSESSMENT and PLAN:  Coni Mehta  is a 46 y o  male with:     Problem List Items Addressed This Visit        Respiratory    Allergic rhinitis     Continue with antihistamine and Flonase  Recommended addition of sinus rinse         MAKAYLA (obstructive sleep apnea)     Continue CPAP  Follows with sleep medicine             Cardiovascular and Mediastinum    Migraine headache    Primary hypertension - Primary     BP Readings from Last 3 Encounters:   05/10/23 118/82   10/20/22 134/92   22 122/84     Controlled, continue Lisinopril 10 mg daily            Other    Generalized anxiety disorder    Moderate episode of recurrent major depressive disorder (HCC)     Mood stable off of medications            SUBJECTIVE:  Coni Mehta  is a 46 y o  male who presents today with a chief complaint of Follow-up (6 mo) and Physical Exam  HPI     Patient presents today for follow-up, he was last seen in October for physical   At that time he was continued on Wellbutrin 150 mg daily for depression and anxiety, this was previously switched from Effexor  Subsequently his dose of Wellbutrin was increased to 300 mg daily in November  Since then he stopped the Wellbutrin several month ago  He had no symptoms of withdrawal and mood has been stable  He reports that time and exercise have helped with his mood  He has been exercising more and eating healthier  He is working with a health   He has been losing weight, sleep medicine told him he should be retested if he continues to lose weight  His allergies have been bothersome  He has been taking antihistamine, recently switched to Zyrtec and just started Flonase       Review of Systems "  Constitutional: Negative for fatigue  HENT: Positive for postnasal drip and rhinorrhea  Respiratory: Negative for shortness of breath  Cardiovascular: Negative for chest pain  Neurological: Negative for dizziness and light-headedness  Psychiatric/Behavioral: Negative for decreased concentration and sleep disturbance  The patient is not nervous/anxious  I have reviewed the patient's Past Medical History  Current Outpatient Medications:   •  Cetirizine HCl (ZYRTEC ALLERGY PO), Take by mouth daily, Disp: , Rfl:   •  fluticasone (FLONASE) 50 mcg/act nasal spray, 1 spray into each nostril daily, Disp: , Rfl:   •  lisinopril (ZESTRIL) 10 mg tablet, Take 1 tablet (10 mg total) by mouth daily, Disp: 90 tablet, Rfl: 3    OBJECTIVE:  /82 (BP Location: Left arm, Patient Position: Sitting, Cuff Size: Standard)   Pulse 72   Temp 97 6 °F (36 4 °C) (Tympanic)   Resp 18   Ht 5' 9\" (1 753 m)   Wt 85 3 kg (188 lb)   SpO2 97%   BMI 27 76 kg/m²    BP Readings from Last 3 Encounters:   05/10/23 118/82   10/20/22 134/92   08/25/22 122/84      Wt Readings from Last 3 Encounters:   05/10/23 85 3 kg (188 lb)   10/20/22 93 9 kg (207 lb)   08/25/22 92 8 kg (204 lb 8 oz)      Physical Exam  Constitutional:       General: He is not in acute distress  Appearance: Normal appearance  He is not ill-appearing or toxic-appearing  HENT:      Head: Normocephalic and atraumatic  Right Ear: External ear normal       Left Ear: External ear normal       Nose: Nose normal       Mouth/Throat:      Mouth: Mucous membranes are moist    Eyes:      Extraocular Movements: Extraocular movements intact  Conjunctiva/sclera: Conjunctivae normal    Cardiovascular:      Rate and Rhythm: Normal rate and regular rhythm  Pulses: Normal pulses  Heart sounds: Normal heart sounds  Pulmonary:      Effort: Pulmonary effort is normal  No respiratory distress  Breath sounds: Normal breath sounds  No stridor   No " "wheezing, rhonchi or rales  Musculoskeletal:      Cervical back: Normal range of motion and neck supple  No rigidity  Skin:     Findings: No erythema or rash  Neurological:      General: No focal deficit present  Mental Status: He is alert and oriented to person, place, and time  Psychiatric:         Mood and Affect: Mood normal          Behavior: Behavior normal            Lab Results   Component Value Date    WBC 6 44 04/21/2023    HGB 16 3 04/21/2023    HCT 48 5 04/21/2023    MCV 89 04/21/2023     04/21/2023     Lab Results   Component Value Date    RLG5GQQZMZDW 1 430 04/21/2023       Lab Results   Component Value Date    SODIUM 141 05/04/2023    K 4 2 05/04/2023     (H) 05/04/2023    CO2 27 05/04/2023    BUN 18 05/04/2023    CREATININE 1 17 05/04/2023    CALCIUM 9 3 05/04/2023     Lab Results   Component Value Date    ALT 25 04/21/2023    AST 16 04/21/2023    ALKPHOS 60 04/21/2023     Lab Results   Component Value Date    CHOLESTEROL 164 04/21/2023    CHOLESTEROL 205 (H) 08/27/2021     Lab Results   Component Value Date    HDL 41 04/21/2023    HDL 41 08/27/2021     Lab Results   Component Value Date    TRIG 171 (H) 04/21/2023    TRIG 191 (H) 08/27/2021     Lab Results   Component Value Date    [place] 123 04/21/2023     Lab Results   Component Value Date    LDLCALC 89 04/21/2023              Return in about 1 year (around 5/10/2024) for Annual physical     Wes Wu MD    Note: Portions of the record have been created with voice recognition software  Occasional wrong word or \"sound a like\" substitutions may have occurred due to the inherent limitations of voice recognition software  Read the chart carefully and recognize, using context, where substitutions have occurred    "

## 2023-05-10 ENCOUNTER — OFFICE VISIT (OUTPATIENT)
Dept: FAMILY MEDICINE CLINIC | Facility: CLINIC | Age: 53
End: 2023-05-10

## 2023-05-10 VITALS
HEIGHT: 69 IN | DIASTOLIC BLOOD PRESSURE: 82 MMHG | HEART RATE: 72 BPM | RESPIRATION RATE: 18 BRPM | WEIGHT: 188 LBS | SYSTOLIC BLOOD PRESSURE: 118 MMHG | BODY MASS INDEX: 27.85 KG/M2 | OXYGEN SATURATION: 97 % | TEMPERATURE: 97.6 F

## 2023-05-10 DIAGNOSIS — I10 PRIMARY HYPERTENSION: Primary | ICD-10-CM

## 2023-05-10 DIAGNOSIS — F41.1 GENERALIZED ANXIETY DISORDER: ICD-10-CM

## 2023-05-10 DIAGNOSIS — G43.009 MIGRAINE WITHOUT AURA AND WITHOUT STATUS MIGRAINOSUS, NOT INTRACTABLE: ICD-10-CM

## 2023-05-10 DIAGNOSIS — G47.33 OSA (OBSTRUCTIVE SLEEP APNEA): ICD-10-CM

## 2023-05-10 DIAGNOSIS — J30.1 SEASONAL ALLERGIC RHINITIS DUE TO POLLEN: ICD-10-CM

## 2023-05-10 DIAGNOSIS — F33.1 MODERATE EPISODE OF RECURRENT MAJOR DEPRESSIVE DISORDER (HCC): ICD-10-CM

## 2023-05-10 RX ORDER — FLUTICASONE PROPIONATE 50 MCG
1 SPRAY, SUSPENSION (ML) NASAL DAILY
COMMUNITY

## 2023-10-26 ENCOUNTER — OFFICE VISIT (OUTPATIENT)
Dept: FAMILY MEDICINE CLINIC | Facility: CLINIC | Age: 53
End: 2023-10-26
Payer: COMMERCIAL

## 2023-10-26 VITALS
HEIGHT: 69 IN | OXYGEN SATURATION: 96 % | DIASTOLIC BLOOD PRESSURE: 80 MMHG | SYSTOLIC BLOOD PRESSURE: 110 MMHG | WEIGHT: 197.5 LBS | BODY MASS INDEX: 29.25 KG/M2 | TEMPERATURE: 97 F | HEART RATE: 93 BPM | RESPIRATION RATE: 16 BRPM

## 2023-10-26 DIAGNOSIS — N52.2 DRUG-INDUCED ERECTILE DYSFUNCTION: ICD-10-CM

## 2023-10-26 DIAGNOSIS — G43.109 MIGRAINE WITH AURA AND WITHOUT STATUS MIGRAINOSUS, NOT INTRACTABLE: Primary | ICD-10-CM

## 2023-10-26 DIAGNOSIS — I10 PRIMARY HYPERTENSION: ICD-10-CM

## 2023-10-26 DIAGNOSIS — R53.83 OTHER FATIGUE: ICD-10-CM

## 2023-10-26 DIAGNOSIS — J32.1 CHRONIC FRONTAL SINUSITIS: ICD-10-CM

## 2023-10-26 DIAGNOSIS — R68.82 LOW LIBIDO: ICD-10-CM

## 2023-10-26 PROCEDURE — 99214 OFFICE O/P EST MOD 30 MIN: CPT | Performed by: FAMILY MEDICINE

## 2023-10-26 RX ORDER — FLUTICASONE PROPIONATE 50 MCG
1 SPRAY, SUSPENSION (ML) NASAL DAILY
Qty: 9.9 ML | Refills: 0 | Status: SHIPPED | OUTPATIENT
Start: 2023-10-26

## 2023-10-26 RX ORDER — SUMATRIPTAN 25 MG/1
25 TABLET, FILM COATED ORAL ONCE AS NEEDED
Qty: 10 TABLET | Refills: 1 | Status: SHIPPED | OUTPATIENT
Start: 2023-10-26

## 2023-10-26 NOTE — PROGRESS NOTES
Name: Tyrel Cantu. : 1970      MRN: [id number]  Encounter Provider: Brad Bae MD  Encounter Date: 10/26/2023   Encounter department: [address] [place] [address]     1. Migraine with aura and without status migrainosus, not intractable  -     SUMAtriptan (Imitrex) 25 mg tablet; Take 1 tablet (25 mg total) by mouth once as needed for migraine for up to 20 doses    2. Low libido  -     Testosterone; Future    3. Other fatigue  -     TSH, 3rd generation with Free T4 reflex; Future    4. Primary hypertension  -     CBC and differential; Future  -     Comprehensive metabolic panel; Future  -     Lipid panel; Future    5. Drug-induced erectile dysfunction    6. Chronic frontal sinusitis  -     fluticasone (FLONASE) 50 mcg/act nasal spray; 1 spray into each nostril daily      Stop Afrin for sinusitis. Start Flonase. Orders above. Obtain labs. Follow-up       Subjective      Patient presents with:  Migraine: Has been gettting them every two weeks dose not take anything to help expect exsedering   Eyesight floater. Loss of focus. Headache starts. Recently have increased. Double vision this weekend. At a high school football game. 5m. Then headache. Unsteady loss of balance. Felt dizzy. Sinuses- allergy medicine. Using afrin daily     Fatigue- run down  - using cpap     Endorses ED with low libido       Review of Systems   Constitutional:  Positive for fatigue. Negative for fever. HENT:  Positive for congestion. Negative for sore throat. Eyes:  Negative for visual disturbance. Respiratory:  Negative for cough, chest tightness and shortness of breath. Cardiovascular:  Negative for chest pain, palpitations and leg swelling. Gastrointestinal:  Negative for abdominal pain, constipation, diarrhea and nausea. Endocrine: Negative for cold intolerance and heat intolerance. Genitourinary:  Negative for flank pain.         ED, decreased libido Musculoskeletal:  Negative for back pain and neck pain. Skin:  Negative for rash. Neurological:  Positive for headaches. Psychiatric/Behavioral:  Negative for behavioral problems and confusion. Current Outpatient Medications on File Prior to Visit   Medication Sig   • lisinopril (ZESTRIL) 10 mg tablet Take 1 tablet (10 mg total) by mouth daily   • Cetirizine HCl (ZYRTEC ALLERGY PO) Take by mouth daily (Patient not taking: Reported on 10/26/2023)       Objective     /80 (BP Location: Left arm, Patient Position: Sitting, Cuff Size: Large)   Pulse 93   Temp (!) 97 °F (36.1 °C)   Resp 16   Ht 5' 9" (1.753 m)   Wt 89.6 kg (197 lb 8 oz)   SpO2 96%   BMI 29.17 kg/m²     Physical Exam  Vitals and nursing note reviewed. Constitutional:       Appearance: He is well-developed. HENT:      Head: Normocephalic and atraumatic. Cardiovascular:      Rate and Rhythm: Normal rate and regular rhythm. Pulmonary:      Effort: Pulmonary effort is normal.      Breath sounds: Normal breath sounds. Neurological:      General: No focal deficit present. Mental Status: He is alert.    Psychiatric:         Mood and Affect: Mood normal.         Behavior: Behavior normal.       Pau Bundy MD

## 2023-10-27 ENCOUNTER — APPOINTMENT (OUTPATIENT)
Dept: LAB | Facility: CLINIC | Age: 53
End: 2023-10-27
Payer: COMMERCIAL

## 2023-10-27 DIAGNOSIS — I10 PRIMARY HYPERTENSION: ICD-10-CM

## 2023-10-27 DIAGNOSIS — R53.83 OTHER FATIGUE: ICD-10-CM

## 2023-10-27 DIAGNOSIS — R68.82 LOW LIBIDO: ICD-10-CM

## 2023-10-27 LAB
ALBUMIN SERPL BCP-MCNC: 3.9 G/DL (ref 3.5–5)
ALP SERPL-CCNC: 40 U/L (ref 34–104)
ALT SERPL W P-5'-P-CCNC: 18 U/L (ref 7–52)
ANION GAP SERPL CALCULATED.3IONS-SCNC: 7 MMOL/L
AST SERPL W P-5'-P-CCNC: 15 U/L (ref 13–39)
BASOPHILS # BLD AUTO: 0.04 THOUSANDS/ÂΜL (ref 0–0.1)
BASOPHILS NFR BLD AUTO: 1 % (ref 0–1)
BILIRUB SERPL-MCNC: 0.67 MG/DL (ref 0.2–1)
BUN SERPL-MCNC: 15 MG/DL (ref 5–25)
CALCIUM SERPL-MCNC: 9.3 MG/DL (ref 8.4–10.2)
CHLORIDE SERPL-SCNC: 107 MMOL/L (ref 96–108)
CHOLEST SERPL-MCNC: 205 MG/DL
CO2 SERPL-SCNC: 30 MMOL/L (ref 21–32)
CREAT SERPL-MCNC: 1.2 MG/DL (ref 0.6–1.3)
EOSINOPHIL # BLD AUTO: 0.12 THOUSAND/ÂΜL (ref 0–0.61)
EOSINOPHIL NFR BLD AUTO: 2 % (ref 0–6)
ERYTHROCYTE [DISTWIDTH] IN BLOOD BY AUTOMATED COUNT: 12.4 % (ref 11.6–15.1)
GFR SERPL CREATININE-BSD FRML MDRD: 68 ML/MIN/1.73SQ M
GLUCOSE P FAST SERPL-MCNC: 93 MG/DL (ref 65–99)
HCT VFR BLD AUTO: 46.2 % (ref 36.5–49.3)
HDLC SERPL-MCNC: 41 MG/DL
HGB BLD-MCNC: 15 G/DL (ref 12–17)
IMM GRANULOCYTES # BLD AUTO: 0.01 THOUSAND/UL (ref 0–0.2)
IMM GRANULOCYTES NFR BLD AUTO: 0 % (ref 0–2)
LDLC SERPL CALC-MCNC: 123 MG/DL (ref 0–100)
LYMPHOCYTES # BLD AUTO: 1.95 THOUSANDS/ÂΜL (ref 0.6–4.47)
LYMPHOCYTES NFR BLD AUTO: 31 % (ref 14–44)
MCH RBC QN AUTO: 29.5 PG (ref 26.8–34.3)
MCHC RBC AUTO-ENTMCNC: 32.5 G/DL (ref 31.4–37.4)
MCV RBC AUTO: 91 FL (ref 82–98)
MONOCYTES # BLD AUTO: 0.51 THOUSAND/ÂΜL (ref 0.17–1.22)
MONOCYTES NFR BLD AUTO: 8 % (ref 4–12)
NEUTROPHILS # BLD AUTO: 3.59 THOUSANDS/ÂΜL (ref 1.85–7.62)
NEUTS SEG NFR BLD AUTO: 58 % (ref 43–75)
NONHDLC SERPL-MCNC: 164 MG/DL
NRBC BLD AUTO-RTO: 0 /100 WBCS
PLATELET # BLD AUTO: 176 THOUSANDS/UL (ref 149–390)
PMV BLD AUTO: 10.5 FL (ref 8.9–12.7)
POTASSIUM SERPL-SCNC: 4.8 MMOL/L (ref 3.5–5.3)
PROT SERPL-MCNC: 6.2 G/DL (ref 6.4–8.4)
RBC # BLD AUTO: 5.08 MILLION/UL (ref 3.88–5.62)
SODIUM SERPL-SCNC: 144 MMOL/L (ref 135–147)
TESTOST SERPL-MSCNC: 400 NG/DL
TRIGL SERPL-MCNC: 206 MG/DL
TSH SERPL DL<=0.05 MIU/L-ACNC: 1.38 UIU/ML (ref 0.45–4.5)
WBC # BLD AUTO: 6.22 THOUSAND/UL (ref 4.31–10.16)

## 2023-10-27 PROCEDURE — 80061 LIPID PANEL: CPT

## 2023-10-27 PROCEDURE — 85025 COMPLETE CBC W/AUTO DIFF WBC: CPT

## 2023-10-27 PROCEDURE — 80053 COMPREHEN METABOLIC PANEL: CPT

## 2023-10-27 PROCEDURE — 84443 ASSAY THYROID STIM HORMONE: CPT

## 2023-10-27 PROCEDURE — 36415 COLL VENOUS BLD VENIPUNCTURE: CPT

## 2023-10-27 PROCEDURE — 84403 ASSAY OF TOTAL TESTOSTERONE: CPT

## 2023-12-04 ENCOUNTER — OFFICE VISIT (OUTPATIENT)
Dept: FAMILY MEDICINE CLINIC | Facility: CLINIC | Age: 53
End: 2023-12-04
Payer: COMMERCIAL

## 2023-12-04 VITALS
DIASTOLIC BLOOD PRESSURE: 70 MMHG | BODY MASS INDEX: 30.07 KG/M2 | RESPIRATION RATE: 16 BRPM | HEIGHT: 69 IN | OXYGEN SATURATION: 97 % | TEMPERATURE: 98.4 F | SYSTOLIC BLOOD PRESSURE: 116 MMHG | WEIGHT: 203 LBS | HEART RATE: 87 BPM

## 2023-12-04 DIAGNOSIS — I10 PRIMARY HYPERTENSION: ICD-10-CM

## 2023-12-04 DIAGNOSIS — R53.82 CHRONIC FATIGUE: Primary | ICD-10-CM

## 2023-12-04 DIAGNOSIS — E55.9 VITAMIN D DEFICIENCY: ICD-10-CM

## 2023-12-04 DIAGNOSIS — Z12.5 SCREENING PSA (PROSTATE SPECIFIC ANTIGEN): ICD-10-CM

## 2023-12-04 DIAGNOSIS — G47.33 OSA (OBSTRUCTIVE SLEEP APNEA): ICD-10-CM

## 2023-12-04 DIAGNOSIS — R68.82 LOW LIBIDO: ICD-10-CM

## 2023-12-04 PROCEDURE — 99214 OFFICE O/P EST MOD 30 MIN: CPT | Performed by: FAMILY MEDICINE

## 2023-12-04 NOTE — PROGRESS NOTES
Name: Derik Ovalles. : 1970      MRN: [id number]  Encounter Provider: Tigre Madrid MD  Encounter Date: 2023   Encounter department: [address] [place] [address]     1. Chronic fatigue    2. Low libido  -     Testosterone, free, total; Future    3. Vitamin D deficiency  -     Vitamin D 25 hydroxy; Future    4. Screening PSA (prostate specific antigen)  -     PSA, Total Screen; Future    5. Primary hypertension    6. MAKAYLA (obstructive sleep apnea)      BMI Counseling: Body mass index is 29.98 kg/m². The BMI is above normal. Nutrition recommendations include decreasing portion sizes, consuming healthier snacks, reducing intake of saturated and trans fat and reducing intake of cholesterol. Exercise recommendations include moderate physical activity 150 minutes/week. No pharmacotherapy was ordered. Patient referred to PCP. Rationale for BMI follow-up plan is due to patient being overweight or obese. Subjective      Fatigue. Run down, trouble losing weight. Decreased mood. Low sex drive     Wearing cpap. More recently he notes an increase in fatigue.          Review of Systems    Current Outpatient Medications on File Prior to Visit   Medication Sig   • Cetirizine HCl (ZYRTEC ALLERGY PO) Take by mouth daily   • fluticasone (FLONASE) 50 mcg/act nasal spray 1 spray into each nostril daily   • lisinopril (ZESTRIL) 10 mg tablet TAKE 1 TABLET BY MOUTH EVERY DAY   • SUMAtriptan (Imitrex) 25 mg tablet Take 1 tablet (25 mg total) by mouth once as needed for migraine for up to 20 doses       Objective     /70 (BP Location: Left arm, Patient Position: Sitting, Cuff Size: Large)   Pulse 87   Temp 98.4 °F (36.9 °C)   Resp 16   Ht 5' 9" (1.753 m)   Wt 92.1 kg (203 lb)   SpO2 97%   BMI 29.98 kg/m²     Physical Exam  Tigre Madrid MD

## 2023-12-04 NOTE — PROGRESS NOTES
Name: August Fowler. : 1970      MRN: [id number]  Encounter Provider: Julian Hood MD  Encounter Date: 2023   Encounter department: [address] [place] [address]     1. Chronic fatigue    2. Low libido  -     Testosterone, free, total; Future    3. Vitamin D deficiency  -     Vitamin D 25 hydroxy; Future    4. Screening PSA (prostate specific antigen)  -     PSA, Total Screen; Future    5. Primary hypertension  Assessment & Plan:  BP Readings from Last 3 Encounters:   23 116/70   10/26/23 110/80   05/10/23 118/82     Controlled, continue Lisinopril 10 mg daily      6. MAKAYLA (obstructive sleep apnea)  Assessment & Plan:  Continue CPAP  Follows with sleep medicine        Patient's testosterone is currently within normal limits however it is on the lower side of normal.  We will repeat free and total testosterone. Reviewed patient's previous lab work. No history of prostate cancer. No absolute contraindications to testosterone therapy if testosterone comes back low. Check a vitamin D level. Can consider discontinuing lisinopril with weight loss and well-controlled pressures. Subjective      Patient presents today to discuss testosterone replacement therapy. Over the past few years she has experienced worsening chronic fatigue, low libido, loss of drive. Does endorse weight loss and is wearing his CPAP. Fatigue did improve with wearing the CPAP but continues to experience significant fatigue on a daily basis. His blood pressure is well controlled. Review of Systems   Constitutional:  Positive for fatigue. Negative for fever. Low libido   HENT:  Negative for sore throat. Eyes:  Negative for visual disturbance. Respiratory:  Negative for cough, chest tightness and shortness of breath. Cardiovascular:  Negative for chest pain, palpitations and leg swelling.    Gastrointestinal:  Negative for abdominal pain, constipation, diarrhea and nausea. Endocrine: Negative for cold intolerance and heat intolerance. Genitourinary:  Negative for flank pain. Musculoskeletal:  Negative for back pain and neck pain. Skin:  Negative for rash. Neurological:  Negative for headaches. Psychiatric/Behavioral:  Negative for behavioral problems, confusion, dysphoric mood, sleep disturbance and suicidal ideas. The patient is not nervous/anxious. Current Outpatient Medications on File Prior to Visit   Medication Sig   • Cetirizine HCl (ZYRTEC ALLERGY PO) Take by mouth daily   • fluticasone (FLONASE) 50 mcg/act nasal spray 1 spray into each nostril daily   • lisinopril (ZESTRIL) 10 mg tablet TAKE 1 TABLET BY MOUTH EVERY DAY   • SUMAtriptan (Imitrex) 25 mg tablet Take 1 tablet (25 mg total) by mouth once as needed for migraine for up to 20 doses       Objective     /70 (BP Location: Left arm, Patient Position: Sitting, Cuff Size: Large)   Pulse 87   Temp 98.4 °F (36.9 °C)   Resp 16   Ht 5' 9" (1.753 m)   Wt 92.1 kg (203 lb)   SpO2 97%   BMI 29.98 kg/m²     Physical Exam  Vitals and nursing note reviewed. Constitutional:       Appearance: He is well-developed. HENT:      Head: Normocephalic and atraumatic. Cardiovascular:      Rate and Rhythm: Normal rate and regular rhythm. Pulmonary:      Effort: Pulmonary effort is normal.      Breath sounds: Normal breath sounds. Neurological:      General: No focal deficit present. Mental Status: He is alert.    Psychiatric:         Mood and Affect: Mood normal.         Behavior: Behavior normal.       Romie Celis MD

## 2023-12-05 NOTE — ASSESSMENT & PLAN NOTE
BP Readings from Last 3 Encounters:   12/04/23 116/70   10/26/23 110/80   05/10/23 118/82     Controlled, continue Lisinopril 10 mg daily

## 2023-12-08 ENCOUNTER — APPOINTMENT (OUTPATIENT)
Dept: LAB | Facility: CLINIC | Age: 53
End: 2023-12-08
Payer: COMMERCIAL

## 2023-12-08 DIAGNOSIS — E55.9 VITAMIN D DEFICIENCY: ICD-10-CM

## 2023-12-08 DIAGNOSIS — Z12.5 SCREENING PSA (PROSTATE SPECIFIC ANTIGEN): ICD-10-CM

## 2023-12-08 DIAGNOSIS — R68.82 LOW LIBIDO: ICD-10-CM

## 2023-12-08 LAB
25(OH)D3 SERPL-MCNC: 23.7 NG/ML (ref 30–100)
PSA SERPL-MCNC: 1.42 NG/ML (ref 0–4)

## 2023-12-08 PROCEDURE — 36415 COLL VENOUS BLD VENIPUNCTURE: CPT

## 2023-12-08 PROCEDURE — 82306 VITAMIN D 25 HYDROXY: CPT

## 2023-12-08 PROCEDURE — G0103 PSA SCREENING: HCPCS

## 2023-12-08 PROCEDURE — 84402 ASSAY OF FREE TESTOSTERONE: CPT

## 2023-12-08 PROCEDURE — 84403 ASSAY OF TOTAL TESTOSTERONE: CPT

## 2023-12-09 LAB
TESTOST FREE SERPL-MCNC: 7.1 PG/ML (ref 7.2–24)
TESTOST SERPL-MCNC: 499 NG/DL (ref 264–916)

## 2023-12-11 DIAGNOSIS — E55.9 VITAMIN D DEFICIENCY: Primary | ICD-10-CM

## 2023-12-11 RX ORDER — ERGOCALCIFEROL 1.25 MG/1
50000 CAPSULE ORAL WEEKLY
Qty: 12 CAPSULE | Refills: 0 | Status: SHIPPED | OUTPATIENT
Start: 2023-12-11 | End: 2024-02-27

## 2023-12-12 ENCOUNTER — TELEPHONE (OUTPATIENT)
Dept: FAMILY MEDICINE CLINIC | Facility: CLINIC | Age: 53
End: 2023-12-12

## 2023-12-12 NOTE — TELEPHONE ENCOUNTER
----- Message from Jeanne San MD sent at 12/11/2023 12:30 PM EST -----  Please call patient with results. Vitamin D low. Prescription vitamin D sent to the pharmacy. Total testosterone is within normal range despite borderline low free testosterone. Testosterone replacement therapy currently not indicated.

## 2023-12-13 NOTE — TELEPHONE ENCOUNTER
Gris Marin called in regarding his lab results, tried transferring to the office but was unable to get through.  Please advise

## 2024-01-01 ENCOUNTER — NURSE TRIAGE (OUTPATIENT)
Dept: OTHER | Facility: OTHER | Age: 54
End: 2024-01-01

## 2024-01-01 DIAGNOSIS — U07.1 COVID-19: Primary | ICD-10-CM

## 2024-01-01 RX ORDER — NIRMATRELVIR AND RITONAVIR 300-100 MG
3 KIT ORAL 2 TIMES DAILY
Qty: 30 TABLET | Refills: 0 | Status: SHIPPED | OUTPATIENT
Start: 2024-01-01 | End: 2024-01-06

## 2024-01-01 NOTE — TELEPHONE ENCOUNTER
"Regarding: covid  ----- Message from Love Blanco sent at 1/1/2024 11:39 AM EST -----  \" I recently tested positive for covid, and I was wondering if I can get a prescription for it.\"    "

## 2024-01-01 NOTE — TELEPHONE ENCOUNTER
"Reason for Disposition  • HIGH RISK for severe COVID complications (e.g., weak immune system, age > 64 years, obesity with BMI > 25, pregnant, chronic lung disease or other chronic medical condition)  (Exception: Already seen by PCP and no new or worsening symptoms.)    Answer Assessment - Initial Assessment Questions  1. COVID-19 DIAGNOSIS: \"Who made your COVID-19 diagnosis?\" \"Was it confirmed by a positive lab test or self-test?\" If not diagnosed by a doctor (or NP/PA), ask \"Are there lots of cases (community spread) where you live?\" Note: See public health department website, if unsure.      Home test    2. COVID-19 EXPOSURE: \"Was there any known exposure to COVID before the symptoms began?\" CDC Definition of close contact: within 6 feet (2 meters) for a total of 15 minutes or more over a 24-hour period.       No    3. ONSET: \"When did the COVID-19 symptoms start?\"       Saturday    4. WORST SYMPTOM: \"What is your worst symptom?\" (e.g., cough, fever, shortness of breath, muscle aches)      Headache / facial pain    5. COUGH: \"Do you have a cough?\" If Yes, ask: \"How bad is the cough?\"        Moderate    6. FEVER: \"Do you have a fever?\" If Yes, ask: \"What is your temperature, how was it measured, and when did it start?\"      Subjective    7. RESPIRATORY STATUS: \"Describe your breathing?\" (e.g., shortness of breath, wheezing, unable to speak)       Breathing ok    8. BETTER-SAME-WORSE: \"Are you getting better, staying the same or getting worse compared to yesterday?\"  If getting worse, ask, \"In what way?\"      A little worse    9. HIGH RISK DISEASE: \"Do you have any chronic medical problems?\" (e.g., asthma, heart or lung disease, weak immune system, obesity, etc.)      BMI: 30, HTN    10. VACCINE: \"Have you had the COVID-19 vaccine?\" If Yes, ask: \"Which one, how many shots, when did you get it?\"        Yes, initial series    11. BOOSTER: \"Have you received your COVID-19 booster?\" If Yes, ask: \"Which one and when did " "you get it?\"        One booster    12. OTHER SYMPTOMS: \"Do you have any other symptoms?\"  (e.g., chills, fatigue, headache, loss of smell or taste, muscle pain, sore throat)        Runny nose, sore throat    13. O2 SATURATION MONITOR:  \"Do you use an oxygen saturation monitor (pulse oximeter) at home?\" If Yes, ask \"What is your reading (oxygen level) today?\" \"What is your usual oxygen saturation reading?\" (e.g., 95%)        Denies    Protocols used: Coronavirus (COVID-19) Diagnosed or Suspected-ADULT-AH    "

## 2024-02-20 ENCOUNTER — APPOINTMENT (OUTPATIENT)
Dept: LAB | Facility: CLINIC | Age: 54
End: 2024-02-20
Payer: COMMERCIAL

## 2024-02-20 ENCOUNTER — TRANSCRIBE ORDERS (OUTPATIENT)
Dept: LAB | Facility: CLINIC | Age: 54
End: 2024-02-20

## 2024-02-20 DIAGNOSIS — R04.0 EPISTAXIS: ICD-10-CM

## 2024-02-20 DIAGNOSIS — R04.0 EPISTAXIS: Primary | ICD-10-CM

## 2024-02-20 LAB
APTT PPP: 31 SECONDS (ref 23–37)
ERYTHROCYTE [DISTWIDTH] IN BLOOD BY AUTOMATED COUNT: 12.5 % (ref 11.6–15.1)
HCT VFR BLD AUTO: 46.7 % (ref 36.5–49.3)
HGB BLD-MCNC: 15.4 G/DL (ref 12–17)
INR PPP: 0.98 (ref 0.84–1.19)
MCH RBC QN AUTO: 29.4 PG (ref 26.8–34.3)
MCHC RBC AUTO-ENTMCNC: 33 G/DL (ref 31.4–37.4)
MCV RBC AUTO: 89 FL (ref 82–98)
PLATELET # BLD AUTO: 206 THOUSANDS/UL (ref 149–390)
PMV BLD AUTO: 10.5 FL (ref 8.9–12.7)
PROTHROMBIN TIME: 12.9 SECONDS (ref 11.6–14.5)
RBC # BLD AUTO: 5.24 MILLION/UL (ref 3.88–5.62)
WBC # BLD AUTO: 7.36 THOUSAND/UL (ref 4.31–10.16)

## 2024-02-20 PROCEDURE — 85730 THROMBOPLASTIN TIME PARTIAL: CPT

## 2024-02-20 PROCEDURE — 36415 COLL VENOUS BLD VENIPUNCTURE: CPT

## 2024-02-20 PROCEDURE — 85610 PROTHROMBIN TIME: CPT

## 2024-02-20 PROCEDURE — 85027 COMPLETE CBC AUTOMATED: CPT
